# Patient Record
Sex: FEMALE | Race: OTHER | Employment: UNEMPLOYED | URBAN - METROPOLITAN AREA
[De-identification: names, ages, dates, MRNs, and addresses within clinical notes are randomized per-mention and may not be internally consistent; named-entity substitution may affect disease eponyms.]

---

## 2018-11-30 ENCOUNTER — LAB ENCOUNTER (OUTPATIENT)
Dept: LAB | Facility: HOSPITAL | Age: 58
End: 2018-11-30
Attending: ORTHOPAEDIC SURGERY
Payer: COMMERCIAL

## 2018-11-30 DIAGNOSIS — Z92.89 HISTORY OF MRI: Primary | ICD-10-CM

## 2018-11-30 PROCEDURE — 84520 ASSAY OF UREA NITROGEN: CPT

## 2018-11-30 PROCEDURE — 36415 COLL VENOUS BLD VENIPUNCTURE: CPT

## 2018-11-30 PROCEDURE — 82565 ASSAY OF CREATININE: CPT

## 2020-05-26 ENCOUNTER — LAB REQUISITION (OUTPATIENT)
Dept: LAB | Facility: HOSPITAL | Age: 60
End: 2020-05-26
Payer: COMMERCIAL

## 2020-05-26 DIAGNOSIS — Z20.828 CONTACT WITH AND (SUSPECTED) EXPOSURE TO OTHER VIRAL COMMUNICABLE DISEASES: ICD-10-CM

## 2021-12-21 ENCOUNTER — TELEPHONE (OUTPATIENT)
Dept: PHYSICAL MEDICINE AND REHAB | Facility: CLINIC | Age: 61
End: 2021-12-21

## 2021-12-21 ENCOUNTER — MED REC SCAN ONLY (OUTPATIENT)
Dept: PHYSICAL MEDICINE AND REHAB | Facility: CLINIC | Age: 61
End: 2021-12-21

## 2021-12-21 ENCOUNTER — OFFICE VISIT (OUTPATIENT)
Dept: PHYSICAL MEDICINE AND REHAB | Facility: CLINIC | Age: 61
End: 2021-12-21

## 2021-12-21 VITALS
DIASTOLIC BLOOD PRESSURE: 70 MMHG | OXYGEN SATURATION: 98 % | BODY MASS INDEX: 30.73 KG/M2 | HEIGHT: 64 IN | SYSTOLIC BLOOD PRESSURE: 132 MMHG | HEART RATE: 55 BPM | WEIGHT: 180 LBS

## 2021-12-21 DIAGNOSIS — M17.12 PRIMARY OSTEOARTHRITIS OF LEFT KNEE: Primary | ICD-10-CM

## 2021-12-21 DIAGNOSIS — M71.22 SYNOVIAL CYST OF LEFT POPLITEAL SPACE: ICD-10-CM

## 2021-12-21 DIAGNOSIS — I25.2 HISTORY OF MI (MYOCARDIAL INFARCTION): ICD-10-CM

## 2021-12-21 DIAGNOSIS — M22.2X9 PATELLOFEMORAL PAIN SYNDROME, UNSPECIFIED LATERALITY: ICD-10-CM

## 2021-12-21 DIAGNOSIS — E66.09 CLASS 1 OBESITY DUE TO EXCESS CALORIES WITH SERIOUS COMORBIDITY AND BODY MASS INDEX (BMI) OF 30.0 TO 30.9 IN ADULT: ICD-10-CM

## 2021-12-21 PROCEDURE — 20611 DRAIN/INJ JOINT/BURSA W/US: CPT | Performed by: PHYSICAL MEDICINE & REHABILITATION

## 2021-12-21 PROCEDURE — 3008F BODY MASS INDEX DOCD: CPT | Performed by: PHYSICAL MEDICINE & REHABILITATION

## 2021-12-21 PROCEDURE — 3078F DIAST BP <80 MM HG: CPT | Performed by: PHYSICAL MEDICINE & REHABILITATION

## 2021-12-21 PROCEDURE — 3075F SYST BP GE 130 - 139MM HG: CPT | Performed by: PHYSICAL MEDICINE & REHABILITATION

## 2021-12-21 PROCEDURE — 99204 OFFICE O/P NEW MOD 45 MIN: CPT | Performed by: PHYSICAL MEDICINE & REHABILITATION

## 2021-12-21 RX ORDER — ASPIRIN 81 MG/1
81 TABLET ORAL
COMMUNITY

## 2021-12-21 RX ORDER — LIDOCAINE HYDROCHLORIDE 10 MG/ML
3.5 INJECTION, SOLUTION INFILTRATION; PERINEURAL ONCE
Status: COMPLETED | OUTPATIENT
Start: 2021-12-21 | End: 2021-12-21

## 2021-12-21 RX ORDER — NAPROXEN 500 MG/1
500 TABLET ORAL 2 TIMES DAILY WITH MEALS
Qty: 60 TABLET | Refills: 0 | Status: SHIPPED | OUTPATIENT
Start: 2021-12-21 | End: 2022-01-18

## 2021-12-21 RX ORDER — TRIAMCINOLONE ACETONIDE 40 MG/ML
20 INJECTION, SUSPENSION INTRA-ARTICULAR; INTRAMUSCULAR ONCE
Status: COMPLETED | OUTPATIENT
Start: 2021-12-21 | End: 2021-12-21

## 2021-12-21 RX ORDER — AMIODARONE HYDROCHLORIDE 200 MG/1
200 TABLET ORAL DAILY
COMMUNITY
Start: 2021-11-21

## 2021-12-21 NOTE — TELEPHONE ENCOUNTER
Initiated authorization for left baker cyst aspiration and CSI CPT 44785+ with Yuriy montes [place] pre-Auth website    Status: Approved  [id number] - DRAIN/INJ JOINT/BURSA W/O US  No authorization required.    - TRIAMCINOLONE ACET INJ NOS  No authoriz

## 2021-12-21 NOTE — TELEPHONE ENCOUNTER
Per Kei Power will not be in effect until 1/1/2022.   (Charged for Visit, Injection and 20% add'l discount per [facility])

## 2021-12-21 NOTE — PROCEDURES
[address]   [facility] Cyst Evaluation and Aspiration Procedure Note    CHIEF COMPLAINT:  Patient presents with:  Knee Pain: New right handed patient comes in for L knee pain, denies N/T.   Pain started in 10/202 with minimal blood loss. All questions were answered. No barriers to learning were identified. Permanent pictures were saved in our PACS systeme. INSTRUCTIONS GIVEN TO PATIENT:    \"You will see an effect in the next 2-3 days.   Please contact m

## 2021-12-21 NOTE — PATIENT INSTRUCTIONS
1) We performed a left baker cyst aspiration and CSI today  2) Get XR of the left knee when you return from [facility]  3) Take Naprosyn 500 mg 1 tablet twice per day with food for the next two weeks and then as needed but no more than 2 tablets per day.  Do no

## 2021-12-21 NOTE — H&P
[facility]  [address] [facility] H&P    Requesting Physician: Jillian Rae    Chief Complaint (Reason for Visit):  Patient presents with:  Knee Pain: New right handed patient comes in for L knee pain, denies N/T.   Pain sta EC Take 81 mg by mouth. • Calcium Carbonate 1500 (600 Ca) MG Oral Tab Take 600 mg by mouth As Directed. • amiodarone 200 MG Oral Tab Take 200 mg by mouth daily.      • naproxen (NAPROSYN) 500 MG Oral Tab Take 1 tablet (500 mg total) by mouth 2 (two) for instablity  Wendy Test: Positive for pain    Gait:  Antalgic    Data  No visits with results within 6 Month(s) from this visit.    Latest known visit with results is:   Lab Requisition on 05/26/2020   Component Date Value Ref Range Status   • Rapid S

## 2022-01-06 ENCOUNTER — HOSPITAL ENCOUNTER (OUTPATIENT)
Dept: ULTRASOUND IMAGING | Facility: HOSPITAL | Age: 62
Discharge: HOME | End: 2022-01-06
Attending: PHYSICAL MEDICINE & REHABILITATION
Payer: COMMERCIAL

## 2022-01-06 ENCOUNTER — OFFICE VISIT (OUTPATIENT)
Dept: PHYSICAL MEDICINE AND REHAB | Facility: CLINIC | Age: 62
End: 2022-01-06
Payer: COMMERCIAL

## 2022-01-06 ENCOUNTER — HOSPITAL ENCOUNTER (OUTPATIENT)
Dept: GENERAL RADIOLOGY | Facility: HOSPITAL | Age: 62
Discharge: HOME | End: 2022-01-06
Attending: PHYSICAL MEDICINE & REHABILITATION
Payer: COMMERCIAL

## 2022-01-06 VITALS
HEART RATE: 60 BPM | WEIGHT: 180 LBS | BODY MASS INDEX: 30.73 KG/M2 | OXYGEN SATURATION: 97 % | SYSTOLIC BLOOD PRESSURE: 140 MMHG | HEIGHT: 64 IN | DIASTOLIC BLOOD PRESSURE: 80 MMHG

## 2022-01-06 DIAGNOSIS — M17.12 PRIMARY OSTEOARTHRITIS OF LEFT KNEE: ICD-10-CM

## 2022-01-06 DIAGNOSIS — E66.09 CLASS 1 OBESITY DUE TO EXCESS CALORIES WITH SERIOUS COMORBIDITY AND BODY MASS INDEX (BMI) OF 30.0 TO 30.9 IN ADULT: ICD-10-CM

## 2022-01-06 DIAGNOSIS — M22.2X9 PATELLOFEMORAL PAIN SYNDROME, UNSPECIFIED LATERALITY: ICD-10-CM

## 2022-01-06 DIAGNOSIS — M67.979 TIBIALIS POSTERIOR TENDINOPATHY: ICD-10-CM

## 2022-01-06 DIAGNOSIS — M79.89 LEFT LEG SWELLING: ICD-10-CM

## 2022-01-06 DIAGNOSIS — M22.2X9 PATELLOFEMORAL PAIN SYNDROME, UNSPECIFIED LATERALITY: Primary | ICD-10-CM

## 2022-01-06 DIAGNOSIS — K29.60 NSAID INDUCED GASTRITIS: ICD-10-CM

## 2022-01-06 DIAGNOSIS — I25.2 HISTORY OF MI (MYOCARDIAL INFARCTION): ICD-10-CM

## 2022-01-06 DIAGNOSIS — T39.395A NSAID INDUCED GASTRITIS: ICD-10-CM

## 2022-01-06 DIAGNOSIS — M71.22 SYNOVIAL CYST OF LEFT POPLITEAL SPACE: ICD-10-CM

## 2022-01-06 PROCEDURE — 99214 OFFICE O/P EST MOD 30 MIN: CPT | Performed by: PHYSICAL MEDICINE & REHABILITATION

## 2022-01-06 PROCEDURE — 3079F DIAST BP 80-89 MM HG: CPT | Performed by: PHYSICAL MEDICINE & REHABILITATION

## 2022-01-06 PROCEDURE — 93971 EXTREMITY STUDY: CPT | Performed by: PHYSICAL MEDICINE & REHABILITATION

## 2022-01-06 PROCEDURE — 3077F SYST BP >= 140 MM HG: CPT | Performed by: PHYSICAL MEDICINE & REHABILITATION

## 2022-01-06 PROCEDURE — 3008F BODY MASS INDEX DOCD: CPT | Performed by: PHYSICAL MEDICINE & REHABILITATION

## 2022-01-06 PROCEDURE — 73564 X-RAY EXAM KNEE 4 OR MORE: CPT | Performed by: PHYSICAL MEDICINE & REHABILITATION

## 2022-01-06 RX ORDER — OMEPRAZOLE 20 MG/1
20 CAPSULE, DELAYED RELEASE ORAL
Qty: 40 CAPSULE | Refills: 0 | Status: SHIPPED | OUTPATIENT
Start: 2022-01-06

## 2022-01-06 NOTE — PATIENT INSTRUCTIONS
1) Continue Naprosyn twice per day and take with omeprazole 20 mg twice per day with food.    2) Get a doppler ultrasound STAT  3) If no blood clot, then would recommend physical therapy as I feel this is posterior tibialis tendinopathy  4) Follow up with monica

## 2022-01-06 NOTE — PROGRESS NOTES
[address]  Progress Note    CHIEF COMPLAINT:  Patient presents with: Ankle Pain: LOV 12/21/21. C/o left ankle pain and swelling started last week w/o cause. Rates 8/10. N/T to toes.  Pain aggravated by walk CURRENT MEDICATIONS:   Current Outpatient Medications   Medication Sig Dispense Refill   • omeprazole 20 MG Oral Capsule Delayed Release Take 1 capsule (20 mg total) by mouth 2 (two) times daily before meals.  40 capsule 0   • aspirin 81 MG Oral Tab E tunnel as well as left lower extremity  Palpation: Tenderness to palpation over the left tarsal tunnel and posterior tibialis muscle belly proximally  ROM: Full active ROM in all planes of the ankle.        Gait:  Antalgic    Data  No visits with results wi NSAID induced gastritis. I have given her a prescription for the omeprazole now. She will focus of physical therapy on both the left ankle as well as left knee. She will follow-up with me in about 6 to 8 weeks.        RTC in 6 to 8 weeks  Discharge Instr

## 2022-01-17 DIAGNOSIS — E66.09 CLASS 1 OBESITY DUE TO EXCESS CALORIES WITH SERIOUS COMORBIDITY AND BODY MASS INDEX (BMI) OF 30.0 TO 30.9 IN ADULT: ICD-10-CM

## 2022-01-17 DIAGNOSIS — M17.12 PRIMARY OSTEOARTHRITIS OF LEFT KNEE: ICD-10-CM

## 2022-01-17 DIAGNOSIS — I25.2 HISTORY OF MI (MYOCARDIAL INFARCTION): ICD-10-CM

## 2022-01-17 DIAGNOSIS — M22.2X9 PATELLOFEMORAL PAIN SYNDROME, UNSPECIFIED LATERALITY: ICD-10-CM

## 2022-01-17 DIAGNOSIS — M71.22 SYNOVIAL CYST OF LEFT POPLITEAL SPACE: ICD-10-CM

## 2022-01-18 RX ORDER — NAPROXEN 500 MG/1
TABLET ORAL
Qty: 60 TABLET | Refills: 0 | Status: SHIPPED | OUTPATIENT
Start: 2022-01-18

## 2022-01-18 NOTE — TELEPHONE ENCOUNTER
Medication request: naproxen (NAPROSYN) 500 MG Oral Tab  Sig:   Take 1 tablet (500 mg total) by mouth 2 (two) times daily with meals. Take for 2 weeks as directed and then as needed.   Qty#60R-0    LOV: 1/6/22  NOV: None    [place]Last [place]:12/21/21

## 2022-06-01 ENCOUNTER — TELEPHONE (OUTPATIENT)
Dept: PHYSICAL MEDICINE AND REHAB | Facility: CLINIC | Age: 62
End: 2022-06-01

## 2022-06-01 DIAGNOSIS — M17.12 OSTEOARTHRITIS OF LEFT KNEE, UNSPECIFIED OSTEOARTHRITIS TYPE: ICD-10-CM

## 2022-06-01 DIAGNOSIS — G89.29 CHRONIC PAIN OF LEFT KNEE: Primary | ICD-10-CM

## 2022-06-01 DIAGNOSIS — M25.562 CHRONIC PAIN OF LEFT KNEE: Primary | ICD-10-CM

## 2022-06-01 NOTE — TELEPHONE ENCOUNTER
Order placed    [employer]  Aries George MD, [address] [facility]  Physical Medicine and Rehabilitation/Sports Medicine  [facility]

## 2022-06-02 ENCOUNTER — TELEPHONE (OUTPATIENT)
Dept: PHYSICAL MEDICINE AND REHAB | Facility: CLINIC | Age: 62
End: 2022-06-02

## 2022-06-02 NOTE — TELEPHONE ENCOUNTER
Completed [facility] Authorization Form to initiate authorization for LEFT knee CSI and baker cyst aspiration under ultrasound guidance CPT 48704+  Clinicals and PA form faxed to [phone]  Status:pending

## 2022-06-06 ENCOUNTER — OFFICE VISIT (OUTPATIENT)
Dept: PHYSICAL MEDICINE AND REHAB | Facility: CLINIC | Age: 62
End: 2022-06-06
Payer: COMMERCIAL

## 2022-06-06 VITALS
HEIGHT: 64 IN | DIASTOLIC BLOOD PRESSURE: 70 MMHG | OXYGEN SATURATION: 98 % | BODY MASS INDEX: 33.46 KG/M2 | SYSTOLIC BLOOD PRESSURE: 132 MMHG | WEIGHT: 196 LBS

## 2022-06-06 DIAGNOSIS — M25.562 CHRONIC PAIN OF LEFT KNEE: Primary | ICD-10-CM

## 2022-06-06 DIAGNOSIS — M67.979 TIBIALIS POSTERIOR TENDINOPATHY: ICD-10-CM

## 2022-06-06 DIAGNOSIS — I25.2 HISTORY OF MI (MYOCARDIAL INFARCTION): ICD-10-CM

## 2022-06-06 DIAGNOSIS — M71.22 SYNOVIAL CYST OF LEFT POPLITEAL SPACE: ICD-10-CM

## 2022-06-06 DIAGNOSIS — E66.09 CLASS 1 OBESITY DUE TO EXCESS CALORIES WITH SERIOUS COMORBIDITY AND BODY MASS INDEX (BMI) OF 30.0 TO 30.9 IN ADULT: ICD-10-CM

## 2022-06-06 DIAGNOSIS — M17.12 OSTEOARTHRITIS OF LEFT KNEE, UNSPECIFIED OSTEOARTHRITIS TYPE: ICD-10-CM

## 2022-06-06 DIAGNOSIS — M17.12 PRIMARY OSTEOARTHRITIS OF LEFT KNEE: ICD-10-CM

## 2022-06-06 DIAGNOSIS — K29.60 NSAID INDUCED GASTRITIS: ICD-10-CM

## 2022-06-06 DIAGNOSIS — M79.89 LEFT LEG SWELLING: ICD-10-CM

## 2022-06-06 DIAGNOSIS — G89.29 CHRONIC PAIN OF LEFT KNEE: Primary | ICD-10-CM

## 2022-06-06 DIAGNOSIS — T39.395A NSAID INDUCED GASTRITIS: ICD-10-CM

## 2022-06-06 DIAGNOSIS — M22.2X9 PATELLOFEMORAL PAIN SYNDROME, UNSPECIFIED LATERALITY: ICD-10-CM

## 2022-06-06 NOTE — PATIENT INSTRUCTIONS
Post Injection Instructions     1. Please do not do anything strenuous over the next two days (if you had a knee injection do not walk more than 2 city blocks, do not attend any aerobic classes, do not run, no heavy lifting, no prolong standing). 2. You may resume your day to day activities after your injection. 3. You may experience some mild amount of swelling after the procedure. 4. Please ice your joint that was injected at least 5-6 times a day (15 minutes) for two days after (this will help prevent worsening pain that sometimes occurs after an injection). 5. Only take tylenol if needed for pain for the first few days. 6. Watch for signs of infection which include redness, warmth, worsening pain, fevers or chills. If you develop any of these signs call the office immediately at [phone]    Everyone responds differently to injections, but you can expect your peak effects a few weeks after your last injection. Yonatan Winkler. Jordan Lozano MD  Physical Medicine and Rehabilitation/Sports Medicine  [facility] [place]    1) Continue home exercise program and weight loss program  2) Tylenol 500-1000 mg every 6-8 hours as needed for pain. No more than 3000 mg daily. 3) Follow up in 6-8 weeks.

## 2022-06-06 NOTE — TELEPHONE ENCOUNTER
Received Approval from [facility] for LEFT knee CSI and baker cyst aspiration under ultrasound guidance with authorization #[id number] valid 6/2/22-9/1/22    LETY Irizarry and Dr. Angel Poole notified.   Patient is receiving injection in office today

## 2022-07-25 ENCOUNTER — OFFICE VISIT (OUTPATIENT)
Dept: PHYSICAL MEDICINE AND REHAB | Facility: CLINIC | Age: 62
End: 2022-07-25
Payer: COMMERCIAL

## 2022-07-25 VITALS — HEIGHT: 64 IN | WEIGHT: 196 LBS | BODY MASS INDEX: 33.46 KG/M2

## 2022-07-25 DIAGNOSIS — M17.12 OSTEOARTHRITIS OF LEFT KNEE, UNSPECIFIED OSTEOARTHRITIS TYPE: ICD-10-CM

## 2022-07-25 DIAGNOSIS — M17.12 PRIMARY OSTEOARTHRITIS OF LEFT KNEE: ICD-10-CM

## 2022-07-25 DIAGNOSIS — K29.60 NSAID INDUCED GASTRITIS: ICD-10-CM

## 2022-07-25 DIAGNOSIS — I25.2 HISTORY OF MI (MYOCARDIAL INFARCTION): ICD-10-CM

## 2022-07-25 DIAGNOSIS — M25.562 CHRONIC PAIN OF LEFT KNEE: Primary | ICD-10-CM

## 2022-07-25 DIAGNOSIS — T39.395A NSAID INDUCED GASTRITIS: ICD-10-CM

## 2022-07-25 DIAGNOSIS — M22.2X9 PATELLOFEMORAL PAIN SYNDROME, UNSPECIFIED LATERALITY: ICD-10-CM

## 2022-07-25 DIAGNOSIS — E66.09 CLASS 1 OBESITY DUE TO EXCESS CALORIES WITH SERIOUS COMORBIDITY AND BODY MASS INDEX (BMI) OF 30.0 TO 30.9 IN ADULT: ICD-10-CM

## 2022-07-25 DIAGNOSIS — G89.29 CHRONIC PAIN OF LEFT KNEE: Primary | ICD-10-CM

## 2022-07-25 DIAGNOSIS — M67.979 TIBIALIS POSTERIOR TENDINOPATHY: ICD-10-CM

## 2022-07-25 DIAGNOSIS — M71.22 SYNOVIAL CYST OF LEFT POPLITEAL SPACE: ICD-10-CM

## 2022-07-25 NOTE — PATIENT INSTRUCTIONS
1) Tylenol 500-1000 mg every 6-8 hours as needed for pain. No more than 3000 mg daily. 2) Schedule MRI LEFT knee to evaluate for meniscal pathology vs tendonitis  3) Continue home exercise program with an emphasis on strengthening and stretching the muscles around the knee and glutes  4) Supplement with daily walking.    5) Lets touch base in about 6-8 weeks but I would like to see you sooner to review the MRI images together

## 2022-09-02 ENCOUNTER — HOSPITAL ENCOUNTER (OUTPATIENT)
Age: 62
Discharge: HOME | End: 2022-09-02
Payer: COMMERCIAL

## 2022-09-02 VITALS
SYSTOLIC BLOOD PRESSURE: 139 MMHG | HEART RATE: 60 BPM | OXYGEN SATURATION: 98 % | DIASTOLIC BLOOD PRESSURE: 51 MMHG | WEIGHT: 180 LBS | BODY MASS INDEX: 30.73 KG/M2 | HEIGHT: 64 IN | TEMPERATURE: 97 F | RESPIRATION RATE: 18 BRPM

## 2022-09-02 DIAGNOSIS — K21.00 GASTROESOPHAGEAL REFLUX DISEASE WITH ESOPHAGITIS WITHOUT HEMORRHAGE: Primary | ICD-10-CM

## 2022-09-02 LAB
#MXD IC: 0.3 X10ˆ3/UL (ref 0.1–1)
BUN BLD-MCNC: 18 MG/DL (ref 7–18)
CHLORIDE BLD-SCNC: 103 MMOL/L (ref 98–112)
CO2 BLD-SCNC: 26 MMOL/L (ref 21–32)
CREAT BLD-MCNC: 0.8 MG/DL
GFR SERPLBLD BASED ON 1.73 SQ M-ARVRAT: 83 ML/MIN/1.73M2 (ref 60–?)
GLUCOSE BLD-MCNC: 124 MG/DL (ref 70–99)
HCT VFR BLD AUTO: 41 %
HCT VFR BLD CALC: 45 %
HGB BLD-MCNC: 13.1 G/DL
ISTAT IONIZED CALCIUM FOR CHEM 8: 1.18 MMOL/L (ref 1.12–1.32)
LYMPHOCYTES # BLD AUTO: 0.9 X10ˆ3/UL (ref 1–4)
LYMPHOCYTES NFR BLD AUTO: 21.5 %
MCH RBC QN AUTO: 28.4 PG (ref 26–34)
MCHC RBC AUTO-ENTMCNC: 32 G/DL (ref 31–37)
MCV RBC AUTO: 88.7 FL (ref 80–100)
MIXED CELL %: 6.6 %
NEUTROPHILS # BLD AUTO: 3.2 X10ˆ3/UL (ref 1.5–7.7)
NEUTROPHILS NFR BLD AUTO: 71.9 %
PLATELET # BLD AUTO: 142 X10ˆ3/UL (ref 150–450)
POTASSIUM BLD-SCNC: 4 MMOL/L (ref 3.6–5.1)
RBC # BLD AUTO: 4.62 X10ˆ6/UL
SODIUM BLD-SCNC: 141 MMOL/L (ref 136–145)
WBC # BLD AUTO: 4.4 X10ˆ3/UL (ref 4–11)

## 2022-09-02 RX ORDER — LIDOCAINE HYDROCHLORIDE 20 MG/ML
5 SOLUTION OROPHARYNGEAL ONCE
Status: COMPLETED | OUTPATIENT
Start: 2022-09-02 | End: 2022-09-02

## 2022-09-02 RX ORDER — MAGNESIUM HYDROXIDE/ALUMINUM HYDROXICE/SIMETHICONE 120; 1200; 1200 MG/30ML; MG/30ML; MG/30ML
30 SUSPENSION ORAL ONCE
Status: COMPLETED | OUTPATIENT
Start: 2022-09-02 | End: 2022-09-02

## 2022-09-02 RX ORDER — DICYCLOMINE HYDROCHLORIDE 10 MG/5ML
10 SOLUTION ORAL ONCE
Status: COMPLETED | OUTPATIENT
Start: 2022-09-02 | End: 2022-09-02

## 2022-09-02 NOTE — ED INITIAL ASSESSMENT (HPI)
Patient presents a&o 4/4 with c/o abd pain that is between RUQ and LUQ. States \"feels like I ate a lot\" but denies eating much food.+ dizziness + weakness  X 4 days. States 6 abnormal BMs, some diarrhea. Denies fever.  Denies blood in stool

## 2022-12-28 ENCOUNTER — HOSPITAL ENCOUNTER (OUTPATIENT)
Age: 62
Discharge: HOME | End: 2022-12-28
Payer: COMMERCIAL

## 2022-12-28 VITALS
RESPIRATION RATE: 18 BRPM | SYSTOLIC BLOOD PRESSURE: 156 MMHG | OXYGEN SATURATION: 99 % | HEART RATE: 57 BPM | BODY MASS INDEX: 30.73 KG/M2 | DIASTOLIC BLOOD PRESSURE: 49 MMHG | HEIGHT: 64 IN | WEIGHT: 180 LBS | TEMPERATURE: 97 F

## 2022-12-28 DIAGNOSIS — R05.1 ACUTE COUGH: ICD-10-CM

## 2022-12-28 DIAGNOSIS — U07.1 COVID-19: Primary | ICD-10-CM

## 2022-12-28 LAB — SARS-COV-2 RNA RESP QL NAA+PROBE: DETECTED

## 2022-12-28 PROCEDURE — U0002 COVID-19 LAB TEST NON-CDC: HCPCS

## 2022-12-28 PROCEDURE — 99213 OFFICE O/P EST LOW 20 MIN: CPT

## 2022-12-28 RX ORDER — FLUTICASONE PROPIONATE 50 MCG
2 SPRAY, SUSPENSION (ML) NASAL DAILY
Qty: 16 G | Refills: 0 | Status: SHIPPED | OUTPATIENT
Start: 2022-12-28 | End: 2023-01-27

## 2022-12-28 NOTE — DISCHARGE INSTRUCTIONS
COVID test was positive. You should quarantine for 5 days. Regardless of your symptoms, you should wear a mask in public for 5 days. You should buy a pulse ox and monitor oxygen level, advised to go to the emergency department if SPO2 is less than 92%. Go to the emergency department if you develop any chest pain, shortness of breath, fever, vomiting, diarrhea or any other concerning complaints. Use Flonase for nasal congestion, mucinex for chest congestion, tylenol or ibuprofen for fever or pain. Increase PO fluid intake. Follow up with PCP in 2-3 days. Can use Coricidin HBP as a decongestant if needed. Do not use any other decongestants.

## 2023-09-16 ENCOUNTER — APPOINTMENT (OUTPATIENT)
Dept: GENERAL RADIOLOGY | Facility: HOSPITAL | Age: 63
End: 2023-09-16
Attending: EMERGENCY MEDICINE
Payer: COMMERCIAL

## 2023-09-16 ENCOUNTER — HOSPITAL ENCOUNTER (EMERGENCY)
Facility: HOSPITAL | Age: 63
Discharge: HOME | End: 2023-09-16
Attending: EMERGENCY MEDICINE
Payer: COMMERCIAL

## 2023-09-16 VITALS
OXYGEN SATURATION: 97 % | DIASTOLIC BLOOD PRESSURE: 84 MMHG | SYSTOLIC BLOOD PRESSURE: 132 MMHG | HEART RATE: 74 BPM | HEIGHT: 64 IN | RESPIRATION RATE: 17 BRPM | BODY MASS INDEX: 31.58 KG/M2 | TEMPERATURE: 97 F | WEIGHT: 185 LBS

## 2023-09-16 DIAGNOSIS — S52.501A CLOSED FRACTURE OF DISTAL ENDS OF RIGHT RADIUS AND ULNA, INITIAL ENCOUNTER: Primary | ICD-10-CM

## 2023-09-16 DIAGNOSIS — S52.601A CLOSED FRACTURE OF DISTAL ENDS OF RIGHT RADIUS AND ULNA, INITIAL ENCOUNTER: Primary | ICD-10-CM

## 2023-09-16 PROCEDURE — 73110 X-RAY EXAM OF WRIST: CPT | Performed by: EMERGENCY MEDICINE

## 2023-09-16 PROCEDURE — 29125 APPL SHORT ARM SPLINT STATIC: CPT

## 2023-09-16 PROCEDURE — 99284 EMERGENCY DEPT VISIT MOD MDM: CPT

## 2023-09-16 PROCEDURE — 73090 X-RAY EXAM OF FOREARM: CPT | Performed by: EMERGENCY MEDICINE

## 2023-09-16 RX ORDER — IBUPROFEN 400 MG/1
400 TABLET ORAL ONCE
Status: COMPLETED | OUTPATIENT
Start: 2023-09-16 | End: 2023-09-16

## 2023-09-16 RX ORDER — ACETAMINOPHEN 500 MG
1000 TABLET ORAL EVERY 6 HOURS PRN
Qty: 56 TABLET | Refills: 0 | Status: SHIPPED | OUTPATIENT
Start: 2023-09-16 | End: 2023-09-30

## 2023-09-16 RX ORDER — IBUPROFEN 200 MG
200 TABLET ORAL EVERY 6 HOURS PRN
Qty: 56 TABLET | Refills: 0 | Status: SHIPPED | OUTPATIENT
Start: 2023-09-16 | End: 2023-09-30

## 2023-09-16 RX ORDER — ACETAMINOPHEN 500 MG
1000 TABLET ORAL ONCE
Status: COMPLETED | OUTPATIENT
Start: 2023-09-16 | End: 2023-09-16

## 2023-09-16 NOTE — ED INITIAL ASSESSMENT (HPI)
Pt to the ed following mechanical fall and fracturing right wrist 1 week ago while in [facility]. Was unable to be treated while there because no ortho was available.    Denies head injury or LOC at time of fall

## 2023-09-16 NOTE — DISCHARGE INSTRUCTIONS
For your pain you may take the following:  -Acetaminophen (tylenol) 1000mg (two extra strength tablets) every 6 hours  -Ibuprofen (motrin) 400mg (two regular strength tablets) every 6 hours  -You should alternate between the two every 3 hours for the next 2 days, then switch to as needed.   -Take the medications with food to avoid an upset stomach.

## 2023-09-22 ENCOUNTER — LAB ENCOUNTER (OUTPATIENT)
Dept: LAB | Age: 63
End: 2023-09-22
Attending: FAMILY MEDICINE
Payer: MEDICARE

## 2023-09-22 DIAGNOSIS — Z01.818 PREOPERATIVE EXAMINATION, UNSPECIFIED: Primary | ICD-10-CM

## 2023-09-22 LAB
ALBUMIN SERPL-MCNC: 4 G/DL (ref 3.4–5)
ALBUMIN/GLOB SERPL: 1.3 {RATIO} (ref 1–2)
ALP LIVER SERPL-CCNC: 87 U/L
ALT SERPL-CCNC: 30 U/L
ANION GAP SERPL CALC-SCNC: 7 MMOL/L (ref 0–18)
AST SERPL-CCNC: 17 U/L (ref 15–37)
ATRIAL RATE: 54 BPM
BILIRUB SERPL-MCNC: 0.7 MG/DL (ref 0.1–2)
BUN BLD-MCNC: 19 MG/DL (ref 7–18)
BUN/CREAT SERPL: 17.8 (ref 10–20)
CALCIUM BLD-MCNC: 9.2 MG/DL (ref 8.5–10.1)
CHLORIDE SERPL-SCNC: 108 MMOL/L (ref 98–112)
CO2 SERPL-SCNC: 27 MMOL/L (ref 21–32)
CREAT BLD-MCNC: 1.07 MG/DL
EGFRCR SERPLBLD CKD-EPI 2021: 58 ML/MIN/1.73M2 (ref 60–?)
FASTING STATUS PATIENT QL REPORTED: NO
GLOBULIN PLAS-MCNC: 3.2 G/DL (ref 2.8–4.4)
GLUCOSE BLD-MCNC: 110 MG/DL (ref 70–99)
OSMOLALITY SERPL CALC.SUM OF ELEC: 297 MOSM/KG (ref 275–295)
P AXIS: 38 DEGREES
P-R INTERVAL: 186 MS
POTASSIUM SERPL-SCNC: 4.1 MMOL/L (ref 3.5–5.1)
PROT SERPL-MCNC: 7.2 G/DL (ref 6.4–8.2)
Q-T INTERVAL: 440 MS
QRS DURATION: 84 MS
QTC CALCULATION (BEZET): 417 MS
R AXIS: 2 DEGREES
SODIUM SERPL-SCNC: 142 MMOL/L (ref 136–145)
T AXIS: -34 DEGREES
VENTRICULAR RATE: 54 BPM

## 2023-09-22 PROCEDURE — 93005 ELECTROCARDIOGRAM TRACING: CPT

## 2023-09-22 PROCEDURE — 80053 COMPREHEN METABOLIC PANEL: CPT

## 2023-09-22 PROCEDURE — 36415 COLL VENOUS BLD VENIPUNCTURE: CPT

## 2023-09-22 PROCEDURE — 93010 ELECTROCARDIOGRAM REPORT: CPT | Performed by: STUDENT IN AN ORGANIZED HEALTH CARE EDUCATION/TRAINING PROGRAM

## 2023-11-12 ENCOUNTER — HOSPITAL ENCOUNTER (EMERGENCY)
Facility: HOSPITAL | Age: 63
Discharge: HOME | End: 2023-11-12
Attending: EMERGENCY MEDICINE
Payer: MEDICARE

## 2023-11-12 ENCOUNTER — APPOINTMENT (OUTPATIENT)
Dept: GENERAL RADIOLOGY | Facility: HOSPITAL | Age: 63
End: 2023-11-12
Payer: MEDICARE

## 2023-11-12 VITALS
TEMPERATURE: 99 F | WEIGHT: 190 LBS | HEART RATE: 66 BPM | BODY MASS INDEX: 31.65 KG/M2 | OXYGEN SATURATION: 97 % | SYSTOLIC BLOOD PRESSURE: 117 MMHG | DIASTOLIC BLOOD PRESSURE: 52 MMHG | RESPIRATION RATE: 12 BRPM | HEIGHT: 65 IN

## 2023-11-12 DIAGNOSIS — R55 SYNCOPE, NEAR: ICD-10-CM

## 2023-11-12 DIAGNOSIS — R07.9 CHEST PAIN OF UNCERTAIN ETIOLOGY: Primary | ICD-10-CM

## 2023-11-12 LAB
ALBUMIN SERPL-MCNC: 4.7 G/DL (ref 3.2–4.8)
ALBUMIN/GLOB SERPL: 1.7 {RATIO} (ref 1–2)
ALP LIVER SERPL-CCNC: 98 U/L
ALT SERPL-CCNC: 11 U/L
ANION GAP SERPL CALC-SCNC: 7 MMOL/L (ref 0–18)
AST SERPL-CCNC: 14 U/L (ref ?–34)
BASOPHILS # BLD AUTO: 0.03 X10(3) UL (ref 0–0.2)
BASOPHILS NFR BLD AUTO: 0.4 %
BILIRUB SERPL-MCNC: 0.7 MG/DL (ref 0.2–1.1)
BUN BLD-MCNC: 21 MG/DL (ref 9–23)
BUN/CREAT SERPL: 17.8 (ref 10–20)
CALCIUM BLD-MCNC: 9.6 MG/DL (ref 8.7–10.4)
CHLORIDE SERPL-SCNC: 104 MMOL/L (ref 98–112)
CO2 SERPL-SCNC: 28 MMOL/L (ref 21–32)
CREAT BLD-MCNC: 1.18 MG/DL
DEPRECATED RDW RBC AUTO: 46.1 FL (ref 35.1–46.3)
EGFRCR SERPLBLD CKD-EPI 2021: 52 ML/MIN/1.73M2 (ref 60–?)
EOSINOPHIL # BLD AUTO: 0.03 X10(3) UL (ref 0–0.7)
EOSINOPHIL NFR BLD AUTO: 0.4 %
ERYTHROCYTE [DISTWIDTH] IN BLOOD BY AUTOMATED COUNT: 13.4 % (ref 11–15)
GLOBULIN PLAS-MCNC: 2.8 G/DL (ref 2.8–4.4)
GLUCOSE BLD-MCNC: 140 MG/DL (ref 70–99)
HCT VFR BLD AUTO: 40.6 %
HGB BLD-MCNC: 12.9 G/DL
IMM GRANULOCYTES # BLD AUTO: 0.02 X10(3) UL (ref 0–1)
IMM GRANULOCYTES NFR BLD: 0.3 %
LYMPHOCYTES # BLD AUTO: 1.69 X10(3) UL (ref 1–4)
LYMPHOCYTES NFR BLD AUTO: 21.8 %
MCH RBC QN AUTO: 29.1 PG (ref 26–34)
MCHC RBC AUTO-ENTMCNC: 31.8 G/DL (ref 31–37)
MCV RBC AUTO: 91.6 FL
MONOCYTES # BLD AUTO: 0.35 X10(3) UL (ref 0.1–1)
MONOCYTES NFR BLD AUTO: 4.5 %
NEUTROPHILS # BLD AUTO: 5.63 X10 (3) UL (ref 1.5–7.7)
NEUTROPHILS # BLD AUTO: 5.63 X10(3) UL (ref 1.5–7.7)
NEUTROPHILS NFR BLD AUTO: 72.6 %
OSMOLALITY SERPL CALC.SUM OF ELEC: 293 MOSM/KG (ref 275–295)
PLATELET # BLD AUTO: 257 10(3)UL (ref 150–450)
POTASSIUM SERPL-SCNC: 3.6 MMOL/L (ref 3.5–5.1)
PROT SERPL-MCNC: 7.5 G/DL (ref 5.7–8.2)
RBC # BLD AUTO: 4.43 X10(6)UL
SODIUM SERPL-SCNC: 139 MMOL/L (ref 136–145)
TROPONIN I SERPL HS-MCNC: 6 NG/L
WBC # BLD AUTO: 7.8 X10(3) UL (ref 4–11)

## 2023-11-12 PROCEDURE — 85025 COMPLETE CBC W/AUTO DIFF WBC: CPT

## 2023-11-12 PROCEDURE — 80053 COMPREHEN METABOLIC PANEL: CPT | Performed by: EMERGENCY MEDICINE

## 2023-11-12 PROCEDURE — 36415 COLL VENOUS BLD VENIPUNCTURE: CPT

## 2023-11-12 PROCEDURE — 93005 ELECTROCARDIOGRAM TRACING: CPT

## 2023-11-12 PROCEDURE — 71045 X-RAY EXAM CHEST 1 VIEW: CPT | Performed by: EMERGENCY MEDICINE

## 2023-11-12 PROCEDURE — 80053 COMPREHEN METABOLIC PANEL: CPT

## 2023-11-12 PROCEDURE — 85025 COMPLETE CBC W/AUTO DIFF WBC: CPT | Performed by: EMERGENCY MEDICINE

## 2023-11-12 PROCEDURE — 84484 ASSAY OF TROPONIN QUANT: CPT | Performed by: EMERGENCY MEDICINE

## 2023-11-12 PROCEDURE — 99284 EMERGENCY DEPT VISIT MOD MDM: CPT

## 2023-11-12 PROCEDURE — 99285 EMERGENCY DEPT VISIT HI MDM: CPT

## 2023-11-12 PROCEDURE — 93010 ELECTROCARDIOGRAM REPORT: CPT

## 2023-11-12 PROCEDURE — 84484 ASSAY OF TROPONIN QUANT: CPT

## 2023-11-12 NOTE — ED INITIAL ASSESSMENT (HPI)
Pt to ED with daughter, daughter provided interpretation, reporting chest pain and feeling faint since 1300, hx of MI. HILTON at rest. Denies nausea, vomiting, diarrhea, and constipation.

## 2023-11-13 LAB
ATRIAL RATE: 73 BPM
P AXIS: 48 DEGREES
P-R INTERVAL: 178 MS
Q-T INTERVAL: 420 MS
QRS DURATION: 90 MS
QTC CALCULATION (BEZET): 462 MS
R AXIS: 12 DEGREES
T AXIS: 64 DEGREES
VENTRICULAR RATE: 73 BPM

## 2023-12-14 ENCOUNTER — HOSPITAL ENCOUNTER (OUTPATIENT)
Dept: MAMMOGRAPHY | Age: 63
Discharge: HOME | End: 2023-12-14
Attending: FAMILY MEDICINE
Payer: MEDICARE

## 2023-12-14 DIAGNOSIS — Z12.31 ENCOUNTER FOR SCREENING MAMMOGRAM FOR MALIGNANT NEOPLASM OF BREAST: ICD-10-CM

## 2023-12-14 PROCEDURE — 77063 BREAST TOMOSYNTHESIS BI: CPT | Performed by: FAMILY MEDICINE

## 2023-12-14 PROCEDURE — 77067 SCR MAMMO BI INCL CAD: CPT | Performed by: FAMILY MEDICINE

## 2024-07-01 ENCOUNTER — TELEPHONE (OUTPATIENT)
Dept: PHYSICAL MEDICINE AND REHAB | Facility: CLINIC | Age: 64
End: 2024-07-01

## 2024-07-01 ENCOUNTER — OFFICE VISIT (OUTPATIENT)
Dept: PHYSICAL MEDICINE AND REHAB | Facility: CLINIC | Age: 64
End: 2024-07-01
Payer: MEDICARE

## 2024-07-01 VITALS — BODY MASS INDEX: 31.65 KG/M2 | WEIGHT: 190 LBS | HEIGHT: 65 IN

## 2024-07-01 DIAGNOSIS — E66.09 CLASS 1 OBESITY DUE TO EXCESS CALORIES WITH SERIOUS COMORBIDITY AND BODY MASS INDEX (BMI) OF 30.0 TO 30.9 IN ADULT: ICD-10-CM

## 2024-07-01 DIAGNOSIS — M25.571 ACUTE RIGHT ANKLE PAIN: ICD-10-CM

## 2024-07-01 DIAGNOSIS — M71.22 SYNOVIAL CYST OF LEFT POPLITEAL SPACE: ICD-10-CM

## 2024-07-01 DIAGNOSIS — M17.12 PRIMARY OSTEOARTHRITIS OF LEFT KNEE: ICD-10-CM

## 2024-07-01 DIAGNOSIS — M22.2X9 PATELLOFEMORAL PAIN SYNDROME, UNSPECIFIED LATERALITY: ICD-10-CM

## 2024-07-01 DIAGNOSIS — M76.821 TIBIALIS POSTERIOR TENDONITIS, RIGHT: Primary | ICD-10-CM

## 2024-07-01 DIAGNOSIS — I25.2 HISTORY OF MI (MYOCARDIAL INFARCTION): ICD-10-CM

## 2024-07-01 RX ORDER — NAPROXEN 500 MG/1
500 TABLET ORAL 2 TIMES DAILY PRN
Qty: 60 TABLET | Refills: 0 | Status: SHIPPED | OUTPATIENT
Start: 2024-07-01

## 2024-07-01 NOTE — TELEPHONE ENCOUNTER
Initiated authorization for RIGHT posterior tibialis tendon CSI under ultrasound CPT 43921, , 09211 with [employer]  Status: Approved-authorization is not required per health plan based on medical necessity however is not a guarantee of payment and may be subject to review once claim is submitted

## 2024-07-01 NOTE — PROCEDURES
[facility]   Posterior Tibialis tendon sheeth Injection Procedure Note     CHIEF COMPLAINT:  Patient presents with:     PROCEDURE PERFORMED:  Right posterior tibialis tendon sheeth corticosteroid injection under ultrasound guidance     INDICATIONS:  Right posterior tibialis tendiopathy     PRIMARY PROCEDURALIST:  Alex Behar     INFORMED CONSENT & TIME OUT:   As documented in the Time Out and Pre-Procedure Check Lists.  Verbal consent was obtained     Vitals:VSAMB@  Labs (document last wbc, plts, hgb, and PT/INR):  No visits with results within 6 Month(s) from this visit.   Latest known visit with results is:   Admission on 11/12/2023, Discharged on 11/12/2023   Component Date Value Ref Range Status    Ventricular rate 11/12/2023 73  BPM Final    Atrial rate 11/12/2023 73  BPM Final    P-R Interval 11/12/2023 178  ms Final    QRS Duration 11/12/2023 90  ms Final    Q-T Interval 11/12/2023 420  ms Final    QTC Calculation (Bezet) 11/12/2023 462  ms Final    P Axis 11/12/2023 48  degrees Final    R Axis 11/12/2023 12  degrees Final    T Garrison 11/12/2023 64  degrees Final    Hold Lavender 11/12/2023 Auto Resulted   Final    Hold Lt Green 11/12/2023 Auto Resulted   Final    Hold Blue 11/12/2023 Auto Resulted   Final    Hold Gold 11/12/2023 Auto Resulted   Final    Glucose 11/12/2023 140 (H)  70 - 99 mg/dL Final    Sodium 11/12/2023 139  136 - 145 mmol/L Final    Potassium 11/12/2023 3.6  3.5 - 5.1 mmol/L Final    Chloride 11/12/2023 104  98 - 112 mmol/L Final    CO2 11/12/2023 28.0  21.0 - 32.0 mmol/L Final    Anion Gap 11/12/2023 7  0 - 18 mmol/L Final    BUN 11/12/2023 21  9 - 23 mg/dL Final    Creatinine 11/12/2023 1.18 (H)  0.55 - 1.02 mg/dL Final    BUN/CREA Ratio 11/12/2023 17.8  10.0 - 20.0 Final    Calcium, Total 11/12/2023 9.6  8.7 - 10.4 mg/dL Final    Calculated Osmolality 11/12/2023 293  275 - 295 mOsm/kg Final    eGFR-Cr 11/12/2023 52 (L)  >=60 mL/min/1.73m2  Final    ALT 11/12/2023 11  10 - 49 U/L Final    AST 11/12/2023 14  <=34 U/L Final    Alkaline Phosphatase 11/12/2023 98  50 - 130 U/L Final    Bilirubin, Total 11/12/2023 0.7  0.2 - 1.1 mg/dL Final    Total Protein 11/12/2023 7.5  5.7 - 8.2 g/dL Final    Albumin 11/12/2023 4.7  3.2 - 4.8 g/dL Final    Globulin  11/12/2023 2.8  2.8 - 4.4 g/dL Final    A/G Ratio 11/12/2023 1.7  1.0 - 2.0 Final    Troponin I (High Sensitivity) 11/12/2023 6  <=34 ng/L Final    WBC 11/12/2023 7.8  4.0 - 11.0 x10(3) uL Final    RBC 11/12/2023 4.43  3.80 - 5.30 x10(6)uL Final    HGB 11/12/2023 12.9  12.0 - 16.0 g/dL Final    HCT 11/12/2023 40.6  35.0 - 48.0 % Final    MCV 11/12/2023 91.6  80.0 - 100.0 fL Final    MCH 11/12/2023 29.1  26.0 - 34.0 pg Final    MCHC 11/12/2023 31.8  31.0 - 37.0 g/dL Final    RDW-SD 11/12/2023 46.1  35.1 - 46.3 fL Final    RDW 11/12/2023 13.4  11.0 - 15.0 % Final    PLT 11/12/2023 257.0  150.0 - 450.0 10(3)uL Final    Neutrophil Absolute Prelim 11/12/2023 5.63  1.50 - 7.70 x10 (3) uL Final    Neutrophil Absolute 11/12/2023 5.63  1.50 - 7.70 x10(3) uL Final    Lymphocyte Absolute 11/12/2023 1.69  1.00 - 4.00 x10(3) uL Final    Monocyte Absolute 11/12/2023 0.35  0.10 - 1.00 x10(3) uL Final    Eosinophil Absolute 11/12/2023 0.03  0.00 - 0.70 x10(3) uL Final    Basophil Absolute 11/12/2023 0.03  0.00 - 0.20 x10(3) uL Final    Immature Granulocyte Absolute 11/12/2023 0.02  0.00 - 1.00 x10(3) uL Final    Neutrophil % 11/12/2023 72.6  % Final    Lymphocyte % 11/12/2023 21.8  % Final    Monocyte % 11/12/2023 4.5  % Final    Eosinophil % 11/12/2023 0.4  % Final    Basophil % 11/12/2023 0.4  % Final    Immature Granulocyte % 11/12/2023 0.3  % Final       PROCEDURE:     The Right medial ankle was prepped and draped in standard sterile fashion using 3 sticks of Betadine.  The Right posterior tibialis tendon as well as posterior tibial artery, vein, and nerve were visualized using the linear ultrasound probe.  Then a  25-gauge 1-1/2 inch needle was inserted under the linear ultrasound probe while injecting 2 cc of 1% lidocaine.  Once a needle was seen within the tendon sheath, a mixture of 2 cc of 1% lidocaine and 1 cc of Kenalog containing 40 mg / 1 cc of corticosteroid was visualized being injected into the tendon sheath.  The needle was then removed, hemostasis was obtained, and a Band-Aid was applied.  The patient tolerated the procedure well.       Patient verbalized understanding of assessment and plan.  Patient is in agreement with the plan.  All questions were answered.  No barriers to learning identified. Permanent pictures were saved in our PACS systeme.         INSTRUCTIONS GIVEN TO PATIENT:    \"You will see an effect in the next 2-3 days.  Please contact me if you have fevers, worsening swelling, worsening pain, decreased range of motion, increased redness, chills, or anything that makes you concerned about how the joint we injected feels/looks.  If you do not reach me in a reasonable time, please report directly to the emergency room for further evaluation\"     2 month     Alex B. Behar MD, [facility]  Physical Medicine and Rehabilitation/Sports Medicine  [facility]

## 2024-07-01 NOTE — PATIENT INSTRUCTIONS
1) Please get X-rays of the RIGHT ankl today on your way out.   2) Take Naprosyn 500 mg 1 tablet twice per day with food for the next two weeks and then as needed but no more than 2 tablets per day. Do not take with any other NSAIDS (Ibuprofen, Advil, Aleve, etc). OK to take Tylenol 500 mg every 6 hours as needed for pain. If you develop any side effects including stomach aches, nausea, vomiting, or other gastrointestinal symptoms, stop the medication and call my office.   3) Tylenol 500-1000 mg every 6-8 hours as needed for pain.  No more than 3000 mg daily.  4) Please begin physical therapy as soon as possible.   5) My office will call you to schedule the RIGHT posterior tibialis tendon CSI under ultrasound once the procedure is approved by your insurance carrier.    6) Follow-up with me in 6 to 8 weeks after you  begin physical therapy. If symptoms do not improve, then I would recommend an MRI of the RIGHT ankle      Instrucciones posteriores a la inyección   No ayaan nada extenuante michele los próximos dos días (si recibió ashley inyección en la [facility], no camine más de 2 stiven de la ciudad, no asista a clases de aeróbicos, no corra, no levante objetos pesados, no permanezca de pie por mucho tiempo).  -Puede reanudar luis fernando actividades cotidianas después de la inyección.  -Es posible que experimente ashley leve hinchazón después del procedimiento.  -Coloque hielo en la articulación que se inyectó al menos 5 a 6 veces al día (15 minutos) michele dos días después (esto ayudará a prevenir el empeoramiento del dolor que a veces ocurre después de ashley inyección).  -Solo tome tylenol si es necesario para el dolor michele los primeros días.  -Esté atento a los signos de infección que incluyen enrojecimiento, calor, empeoramiento del dolor, fiebre o escalofríos. Si desarrolla alguno de estos signos, llame a la oficina de inmediato al [phone]  -Todos responden a chapa manera diferente a las inyecciones, mee puede esperar luis fernando  efectos máximos unas semanas después de chapa última inyección.  Alex B. Behar MD  Physical Medicine and Rehabilitation/Sports Medicine  [facility]

## 2024-07-01 NOTE — PROGRESS NOTES
[facility]  Progress Note    CHIEF COMPLAINT:    Chief Complaint   Patient presents with    Ankle Pain     LOV: 7/25/2022 pt comes in with medial R ankle pain. Denies T/N. Denies weakness. Rates pain 5/10. Takes tylenol and ibuprofen PRN. States she was taking Tramadol 37.5mg from [place].       History of Present Illness:  The patient is a 63 year old  female with a significant past medical history of MI and right knee replacement who presents for follow-up with complaints of right medial ankle pain which has been ongoing for the last week and a half without an inciting event.  She rates her pain about a 5-6 out of 10, sharp and aching, and nonradiating.  Her symptoms are aggravated when she takes a step on the right side as well as going up and down stairs.  She has tried Tylenol and ibuprofen as well as tramadol 37.5 mg which she has from [place].  She denies any injections.  No imaging has been performed.  She does have a history of a right tibia fracture in the proximal aspect several years ago where she had an open reduction internal fixation.  She also had a large skin wound in the right medial third of the tibia.  This is as a result of her surgery.  She denies any paresthesias or weakness in the right leg or foot.  She did recently switch to [place] shoes.    PAST MEDICAL HISTORY:  Past Medical History:    Heart attack (HCC)       SURGICAL HISTORY:  Past Surgical History:   Procedure Laterality Date    Orif radius & ulna fractures Right     Tibia fracture surgery Right        SOCIAL HISTORY:   Social History     Occupational History    Not on file   Tobacco Use    Smoking status: Never    Smokeless tobacco: Never   Vaping Use    Vaping status: Never Used   Substance and Sexual Activity    Alcohol use: Never    Drug use: Never    Sexual activity: Not on file       FAMILY HISTORY:   Family History   Problem Relation Age of Onset    No Known Problems Mother      Diabetes Sister     Diabetes Brother        CURRENT MEDICATIONS:   Current Outpatient Medications   Medication Sig Dispense Refill    naproxen 500 MG Oral Tab Take 1 tablet (500 mg total) by mouth 2 (two) times daily as needed. 60 tablet 0    omeprazole 20 MG Oral Capsule Delayed Release Take 1 capsule (20 mg total) by mouth 2 (two) times daily before meals. 40 capsule 0    aspirin 81 MG Oral Tab EC Take 1 tablet (81 mg total) by mouth.      Calcium Carbonate 1500 (600 Ca) MG Oral Tab Take 600 mg by mouth As Directed.      amiodarone 200 MG Oral Tab Take 1 tablet (200 mg total) by mouth daily.         ALLERGIES:   No Known Allergies    REVIEW OF SYSTEMS:   Review of Systems   Constitutional: Negative.    HENT: Negative.    Eyes: Negative.    Respiratory: Negative.    Cardiovascular: Negative.    Gastrointestinal: Negative.    Genitourinary: Negative.    Musculoskeletal: As per HPI  Skin: Negative.    Neurological: As per HPI  Endo/Heme/Allergies: Negative.    Psychiatric/Behavioral: Negative.      All other systems reviewed and are negative. Pertinent positives and negatives noted in the HPI.        PHYSICAL EXAM:   Ht 65\"   Wt 190 lb (86.2 kg)   BMI 31.62 kg/m²     Body mass index is 31.62 kg/m².      General: No immediate distress  Head: Normocephalic/ Atraumatic  Eyes: Extra-occular movements intact.   Ears: No auricular hematoma or deformities  Mouth: No lesions or ulcerations  Heart: peripheral pulses intact. Normal capillary refill.   Lungs: Non-labored respirations  Abdomen: No abdominal guarding  Extremities: No lower extremity edema bilaterally   Skin: No lesions noted.   Cognition: alert & oriented x 3, attentive, able to follow 2 step commands, comprehention intact, spontaneous speech intact  Motor:    Musculoskeletal:    ANKLE/FOOT  Inspection: no erythema, swelling, or obvious deformity.  Palpation: Tender to palpation over the right tarsal tunnel without any paresthesias  ROM: Full active ROM in all  planes of the ankle.   Strength: 5 out of 5 in all myotomes of the lower extremity  Sensation: Intact to light touch in all dermatomes of the lower extremities  Reflexes: 2/4 at L4 and S1  Anterior Drawer: Negative  Talar Tilt: Negative  Velasquez: Negative      Data  No visits with results within 6 Month(s) from this visit.   Latest known visit with results is:   Admission on 11/12/2023, Discharged on 11/12/2023   Component Date Value Ref Range Status    Ventricular rate 11/12/2023 73  BPM Final    Atrial rate 11/12/2023 73  BPM Final    P-R Interval 11/12/2023 178  ms Final    QRS Duration 11/12/2023 90  ms Final    Q-T Interval 11/12/2023 420  ms Final    QTC Calculation (Bezet) 11/12/2023 462  ms Final    P Axis 11/12/2023 48  degrees Final    R Axis 11/12/2023 12  degrees Final    T [place] 11/12/2023 64  degrees Final    Hold Lavender 11/12/2023 Auto Resulted   Final    Hold Lt Green 11/12/2023 Auto Resulted   Final    Hold Blue 11/12/2023 Auto Resulted   Final    Hold Gold 11/12/2023 Auto Resulted   Final    Glucose 11/12/2023 140 (H)  70 - 99 mg/dL Final    Sodium 11/12/2023 139  136 - 145 mmol/L Final    Potassium 11/12/2023 3.6  3.5 - 5.1 mmol/L Final    Chloride 11/12/2023 104  98 - 112 mmol/L Final    CO2 11/12/2023 28.0  21.0 - 32.0 mmol/L Final    Anion Gap 11/12/2023 7  0 - 18 mmol/L Final    BUN 11/12/2023 21  9 - 23 mg/dL Final    Creatinine 11/12/2023 1.18 (H)  0.55 - 1.02 mg/dL Final    BUN/CREA Ratio 11/12/2023 17.8  10.0 - 20.0 Final    Calcium, Total 11/12/2023 9.6  8.7 - 10.4 mg/dL Final    Calculated Osmolality 11/12/2023 293  275 - 295 mOsm/kg Final    eGFR-Cr 11/12/2023 52 (L)  >=60 mL/min/1.73m2 Final    ALT 11/12/2023 11  10 - 49 U/L Final    AST 11/12/2023 14  <=34 U/L Final    Alkaline Phosphatase 11/12/2023 98  50 - 130 U/L Final    Bilirubin, Total 11/12/2023 0.7  0.2 - 1.1 mg/dL Final    Total Protein 11/12/2023 7.5  5.7 - 8.2 g/dL Final    Albumin 11/12/2023 4.7  3.2 - 4.8 g/dL Final     Globulin  11/12/2023 2.8  2.8 - 4.4 g/dL Final    A/G Ratio 11/12/2023 1.7  1.0 - 2.0 Final    Troponin I (High Sensitivity) 11/12/2023 6  <=34 ng/L Final    WBC 11/12/2023 7.8  4.0 - 11.0 x10(3) uL Final    RBC 11/12/2023 4.43  3.80 - 5.30 x10(6)uL Final    HGB 11/12/2023 12.9  12.0 - 16.0 g/dL Final    HCT 11/12/2023 40.6  35.0 - 48.0 % Final    MCV 11/12/2023 91.6  80.0 - 100.0 fL Final    MCH 11/12/2023 29.1  26.0 - 34.0 pg Final    MCHC 11/12/2023 31.8  31.0 - 37.0 g/dL Final    RDW-SD 11/12/2023 46.1  35.1 - 46.3 fL Final    RDW 11/12/2023 13.4  11.0 - 15.0 % Final    PLT 11/12/2023 257.0  150.0 - 450.0 10(3)uL Final    Neutrophil Absolute Prelim 11/12/2023 5.63  1.50 - 7.70 x10 (3) uL Final    Neutrophil Absolute 11/12/2023 5.63  1.50 - 7.70 x10(3) uL Final    Lymphocyte Absolute 11/12/2023 1.69  1.00 - 4.00 x10(3) uL Final    Monocyte Absolute 11/12/2023 0.35  0.10 - 1.00 x10(3) uL Final    Eosinophil Absolute 11/12/2023 0.03  0.00 - 0.70 x10(3) uL Final    Basophil Absolute 11/12/2023 0.03  0.00 - 0.20 x10(3) uL Final    Immature Granulocyte Absolute 11/12/2023 0.02  0.00 - 1.00 x10(3) uL Final    Neutrophil % 11/12/2023 72.6  % Final    Lymphocyte % 11/12/2023 21.8  % Final    Monocyte % 11/12/2023 4.5  % Final    Eosinophil % 11/12/2023 0.4  % Final    Basophil % 11/12/2023 0.4  % Final    Immature Granulocyte % 11/12/2023 0.3  % Final   ]      Radiology Imaging:  NA      ASSESSMENT AND PLAN:  Kaitlyn is a pleasant 63-year-old female who presents with right medial ankle pain with point tenderness over the tarsal tunnel tendons.  I believe her symptoms are due to posterior tibialis tendinopathy and insufficiency which may be related to utilizing new shoes.  I am recommending x-rays of the right ankle, Naprosyn, Tylenol, physical therapy, and a right posterior tibialis tendon sheath corticosteroid injection under ultrasound guidance.  She will follow-up with me in about 6 to 8 weeks.  If her symptoms  continue, then I would recommend an MRI of the right ankle.       RTC in 6 to 8 weeks  Discharge Instructions were provided as documented in AVS summary.  The patient was in agreement with the assessment and plan.  All questions were answered.  There were no barriers to learning.         1. Tibialis posterior tendonitis, right    2. Acute right ankle pain    3. Primary osteoarthritis of left knee    4. Patellofemoral pain syndrome, unspecified laterality    5. Synovial cyst of left popliteal space    6. Class 1 obesity due to excess calories with serious comorbidity and body mass index (BMI) of 30.0 to 30.9 in adult    7. History of MI (myocardial infarction)        Alex B. Behar MD  Physical Medicine and Rehabilitation/Sports Medicine  [facility]

## 2024-09-12 ENCOUNTER — OFFICE VISIT (OUTPATIENT)
Dept: PHYSICAL MEDICINE AND REHAB | Facility: CLINIC | Age: 64
End: 2024-09-12
Payer: MEDICARE

## 2024-09-12 DIAGNOSIS — M76.821 TIBIALIS POSTERIOR TENDONITIS, RIGHT: Primary | ICD-10-CM

## 2024-09-12 DIAGNOSIS — M25.571 ACUTE RIGHT ANKLE PAIN: ICD-10-CM

## 2024-09-12 DIAGNOSIS — M22.2X9 PATELLOFEMORAL PAIN SYNDROME, UNSPECIFIED LATERALITY: ICD-10-CM

## 2024-09-12 DIAGNOSIS — M17.12 PRIMARY OSTEOARTHRITIS OF LEFT KNEE: ICD-10-CM

## 2024-09-12 PROCEDURE — 99213 OFFICE O/P EST LOW 20 MIN: CPT | Performed by: PHYSICAL MEDICINE & REHABILITATION

## 2024-09-12 NOTE — PROGRESS NOTES
[facility]  Progress Note    CHIEF COMPLAINT:    Chief Complaint   Patient presents with    Follow - Up     LOV 7/1/24. Patient presents for right ankle f/u. Pain 0/10. Denies N/T. Denies weakness. HEP daily wih relief.       History of Present Illness:  The patient is a 63 year old female with a significant past medical history of MI and right knee replacement who presents for follow-up with complaints of right medial ankle pain.  This post right posterior tibialis tendon corticosteroid injection on 7/1/2020 for which she found to be tremendously helpful.  She has also been compliant with a home exercise program and has been only using Naprosyn as needed occasionally.  She rates her pain today a 0 out of 10 in the right ankle.  She denies any radicular symptoms including numbness and tingling.    PAST MEDICAL HISTORY:  Past Medical History:    Heart attack (HCC)       SURGICAL HISTORY:  Past Surgical History:   Procedure Laterality Date    Orif radius & ulna fractures Right     Tibia fracture surgery Right        SOCIAL HISTORY:   Social History     Occupational History    Not on file   Tobacco Use    Smoking status: Never    Smokeless tobacco: Never   Vaping Use    Vaping status: Never Used   Substance and Sexual Activity    Alcohol use: Never    Drug use: Never    Sexual activity: Not on file       FAMILY HISTORY:   Family History   Problem Relation Age of Onset    No Known Problems Mother     Diabetes Sister     Diabetes Brother        CURRENT MEDICATIONS:   Current Outpatient Medications   Medication Sig Dispense Refill    naproxen 500 MG Oral Tab Take 1 tablet (500 mg total) by mouth 2 (two) times daily as needed. 60 tablet 0    omeprazole 20 MG Oral Capsule Delayed Release Take 1 capsule (20 mg total) by mouth 2 (two) times daily before meals. 40 capsule 0    aspirin 81 MG Oral Tab EC Take 1 tablet (81 mg total) by mouth.      Calcium Carbonate 1500 (600 Ca) MG  Oral Tab Take 600 mg by mouth As Directed.      amiodarone 200 MG Oral Tab Take 1 tablet (200 mg total) by mouth daily.         ALLERGIES:   No Known Allergies    REVIEW OF SYSTEMS:   Review of Systems   Constitutional: Negative.    HENT: Negative.    Eyes: Negative.    Respiratory: Negative.    Cardiovascular: Negative.    Gastrointestinal: Negative.    Genitourinary: Negative.    Musculoskeletal: As per HPI  Skin: Negative.    Neurological: As per HPI  Endo/Heme/Allergies: Negative.    Psychiatric/Behavioral: Negative.      All other systems reviewed and are negative. Pertinent positives and negatives noted in the HPI.        PHYSICAL EXAM:   There were no vitals taken for this visit.    There is no height or weight on file to calculate BMI.      General: No immediate distress  Head: Normocephalic/ Atraumatic  Eyes: Extra-occular movements intact.   Ears: No auricular hematoma or deformities  Mouth: No lesions or ulcerations  Heart: peripheral pulses intact. Normal capillary refill.   Lungs: Non-labored respirations  Abdomen: No abdominal guarding  Extremities: No lower extremity edema bilaterally   Skin: No lesions noted.   Cognition: alert & oriented x 3, attentive, able to follow 2 step commands, comprehention intact, spontaneous speech intact  Motor:    Musculoskeletal:        Data  No visits with results within 6 Month(s) from this visit.   Latest known visit with results is:   Admission on 11/12/2023, Discharged on 11/12/2023   Component Date Value Ref Range Status    Ventricular rate 11/12/2023 73  BPM Final    Atrial rate 11/12/2023 73  BPM Final    P-R Interval 11/12/2023 178  ms Final    QRS Duration 11/12/2023 90  ms Final    Q-T Interval 11/12/2023 420  ms Final    QTC Calculation (Bezet) 11/12/2023 462  ms Final    P Axis 11/12/2023 48  degrees Final    R Axis 11/12/2023 12  degrees Final    T [place] 11/12/2023 64  degrees Final    Hold Lavender 11/12/2023 Auto Resulted   Final    Hold Lt Green  11/12/2023 Auto Resulted   Final    Hold Blue 11/12/2023 Auto Resulted   Final    Hold Gold 11/12/2023 Auto Resulted   Final    Glucose 11/12/2023 140 (H)  70 - 99 mg/dL Final    Sodium 11/12/2023 139  136 - 145 mmol/L Final    Potassium 11/12/2023 3.6  3.5 - 5.1 mmol/L Final    Chloride 11/12/2023 104  98 - 112 mmol/L Final    CO2 11/12/2023 28.0  21.0 - 32.0 mmol/L Final    Anion Gap 11/12/2023 7  0 - 18 mmol/L Final    BUN 11/12/2023 21  9 - 23 mg/dL Final    Creatinine 11/12/2023 1.18 (H)  0.55 - 1.02 mg/dL Final    BUN/CREA Ratio 11/12/2023 17.8  10.0 - 20.0 Final    Calcium, Total 11/12/2023 9.6  8.7 - 10.4 mg/dL Final    Calculated Osmolality 11/12/2023 293  275 - 295 mOsm/kg Final    eGFR-Cr 11/12/2023 52 (L)  >=60 mL/min/1.73m2 Final    ALT 11/12/2023 11  10 - 49 U/L Final    AST 11/12/2023 14  <=34 U/L Final    Alkaline Phosphatase 11/12/2023 98  50 - 130 U/L Final    Bilirubin, Total 11/12/2023 0.7  0.2 - 1.1 mg/dL Final    Total Protein 11/12/2023 7.5  5.7 - 8.2 g/dL Final    Albumin 11/12/2023 4.7  3.2 - 4.8 g/dL Final    Globulin  11/12/2023 2.8  2.8 - 4.4 g/dL Final    A/G Ratio 11/12/2023 1.7  1.0 - 2.0 Final    Troponin I (High Sensitivity) 11/12/2023 6  <=34 ng/L Final    WBC 11/12/2023 7.8  4.0 - 11.0 x10(3) uL Final    RBC 11/12/2023 4.43  3.80 - 5.30 x10(6)uL Final    HGB 11/12/2023 12.9  12.0 - 16.0 g/dL Final    HCT 11/12/2023 40.6  35.0 - 48.0 % Final    MCV 11/12/2023 91.6  80.0 - 100.0 fL Final    MCH 11/12/2023 29.1  26.0 - 34.0 pg Final    MCHC 11/12/2023 31.8  31.0 - 37.0 g/dL Final    RDW-SD 11/12/2023 46.1  35.1 - 46.3 fL Final    RDW 11/12/2023 13.4  11.0 - 15.0 % Final    PLT 11/12/2023 257.0  150.0 - 450.0 10(3)uL Final    Neutrophil Absolute Prelim 11/12/2023 5.63  1.50 - 7.70 x10 (3) uL Final    Neutrophil Absolute 11/12/2023 5.63  1.50 - 7.70 x10(3) uL Final    Lymphocyte Absolute 11/12/2023 1.69  1.00 - 4.00 x10(3) uL Final    Monocyte Absolute 11/12/2023 0.35  0.10 - 1.00  x10(3) uL Final    Eosinophil Absolute 11/12/2023 0.03  0.00 - 0.70 x10(3) uL Final    Basophil Absolute 11/12/2023 0.03  0.00 - 0.20 x10(3) uL Final    Immature Granulocyte Absolute 11/12/2023 0.02  0.00 - 1.00 x10(3) uL Final    Neutrophil % 11/12/2023 72.6  % Final    Lymphocyte % 11/12/2023 21.8  % Final    Monocyte % 11/12/2023 4.5  % Final    Eosinophil % 11/12/2023 0.4  % Final    Basophil % 11/12/2023 0.4  % Final    Immature Granulocyte % 11/12/2023 0.3  % Final   ]      Radiology Imaging:  NA    ASSESSMENT AND PLAN:  Kaitlyn is a pleasant 63-year-old female presents for follow-up of her right medial ankle pain due to posterior tibialis tendinopathy and insufficiency.  She is doing very well following the corticosteroid injection and home exercise program.  She can continue using Tylenol as needed for pain and follow-up with me as needed.       RTC in as needed  Discharge Instructions were provided as documented in AVS summary.  The patient was in agreement with the assessment and plan.  All questions were answered.  There were no barriers to learning.         1. Tibialis posterior tendonitis, right    2. Acute right ankle pain    3. Primary osteoarthritis of left knee    4. Patellofemoral pain syndrome, unspecified laterality        Alex B. Behar MD  Physical Medicine and Rehabilitation/Sports Medicine  [facility]

## 2024-12-31 ENCOUNTER — HOSPITAL ENCOUNTER (OUTPATIENT)
Dept: MAMMOGRAPHY | Age: 64
Discharge: HOME | End: 2024-12-31
Attending: FAMILY MEDICINE
Payer: MEDICARE

## 2024-12-31 DIAGNOSIS — Z12.31 ENCOUNTER FOR SCREENING MAMMOGRAM FOR MALIGNANT NEOPLASM OF BREAST: ICD-10-CM

## 2024-12-31 PROCEDURE — 77063 BREAST TOMOSYNTHESIS BI: CPT | Performed by: FAMILY MEDICINE

## 2024-12-31 PROCEDURE — 77067 SCR MAMMO BI INCL CAD: CPT | Performed by: FAMILY MEDICINE

## 2025-05-12 ENCOUNTER — HOSPITAL ENCOUNTER (OUTPATIENT)
Age: 65
Discharge: HOME | End: 2025-05-12
Payer: MEDICARE

## 2025-05-12 ENCOUNTER — APPOINTMENT (OUTPATIENT)
Dept: GENERAL RADIOLOGY | Age: 65
End: 2025-05-12
Attending: PHYSICIAN ASSISTANT
Payer: MEDICARE

## 2025-05-12 VITALS
RESPIRATION RATE: 16 BRPM | HEART RATE: 72 BPM | DIASTOLIC BLOOD PRESSURE: 66 MMHG | OXYGEN SATURATION: 100 % | TEMPERATURE: 98 F | SYSTOLIC BLOOD PRESSURE: 155 MMHG

## 2025-05-12 DIAGNOSIS — R03.0 ELEVATED BLOOD PRESSURE READING: ICD-10-CM

## 2025-05-12 DIAGNOSIS — S43.402A SPRAIN OF LEFT SHOULDER, UNSPECIFIED SHOULDER SPRAIN TYPE, INITIAL ENCOUNTER: Primary | ICD-10-CM

## 2025-05-12 PROCEDURE — 73030 X-RAY EXAM OF SHOULDER: CPT | Performed by: PHYSICIAN ASSISTANT

## 2025-05-12 PROCEDURE — 73060 X-RAY EXAM OF HUMERUS: CPT | Performed by: PHYSICIAN ASSISTANT

## 2025-05-12 PROCEDURE — 99213 OFFICE O/P EST LOW 20 MIN: CPT | Performed by: PHYSICIAN ASSISTANT

## 2025-05-12 RX ORDER — ACETAMINOPHEN 325 MG/1
650 TABLET ORAL
Qty: 40 TABLET | Refills: 0 | Status: SHIPPED | OUTPATIENT
Start: 2025-05-12

## 2025-05-12 NOTE — ED INITIAL ASSESSMENT (HPI)
Pt states she was turning and lost her balance and fell, landing on her left shoulder. Denies hitting head/loc. No obvious deformity noted. + cms.

## 2025-05-12 NOTE — ED PROVIDER NOTES
Patient Seen in: [facility]    History     Chief Complaint   Patient presents with    Shoulder Injury     Stated Complaint: L shoulder Pain from Fall    HPI    HPI: Kaitlyn Mcbride is a 64 year old female who presents with chief complaint of right shoulder pain and right upper arm pain.  Onset this morning.  Patient states that she sustained mechanical fall, landing on her left shoulder.  Patient denies other injury, head/neck injury or pain, open wound, decreased range of motion, swelling, ecchymosis, erythema, weakness, paraesthesias.      Past Medical History[1]    Past Surgical History[2]         Family History[3]    Short Social Hx on File[4]    Review of Systems    Positive for stated complaint: L shoulder Pain from Fall  Other systems are as noted in HPI.  Constitutional and vital signs reviewed.      All other systems reviewed and negative except as noted above.    PSFH elements reviewed from today and agreed except as otherwise stated in HPI.    Physical Exam     ED Triage Vitals [05/12/25 1202]   /66   Pulse 72   Resp 16   Temp 98.3 °F (36.8 °C)   Temp src Oral   SpO2 100 %   O2 Device None (Room air)       Current:/66   Pulse 72   Temp 98.3 °F (36.8 °C) (Oral)   Resp 16   SpO2 100%     PULSE OX within normal limits on room air as interpreted by this provider.    Physical Exam      Constitutional: The patient is cooperative. Appears well-developed and well-nourished.  Mild discomfort.  Psychological: Alert, No abnormalities of mood, affect.  Head: Normocephalic/atraumatic.  Eyes: Pupils are equal round reactive to light.  Conjunctiva are within normal limits.  ENT: Oropharynx is clear.  Neck: The neck is supple.  No meningeal signs.  Chest: There is no tenderness to the chest wall.  Respiratory: Respiratory effort was normal.  There is no rales, wheezes, or rhonchi.  There is no stridor.  Air entry is equal.  Cardiovascular: Regular rate and rhythm.  Capillary refill is brisk.    Genitourinary: Not examined.  Lymphatic: No gross lymphadenopathy noted.  Musculoskeletal: Left upper extremity-Left upper extremity normal to inspection without acute bony deformity.  Positive tenderness to palpation present at left anterior shoulder and left mid upper arm.  Remainder of left upper extremity nontender to palpation.  Full range of motion of left shoulder with reported pain.  Distal pulses intact.  Capillary refill normal.  Motor intact distally.  Sensory intact distally.  No ecchymosis.  No erythema.  No swelling.  No open wounds.  No compartment syndrome.  No edema.  Remainder of musculoskeletal system is grossly intact.  There is no obvious deformity.  Neurological: Gross motor movement is intact in all 4 extremities.  Patient exhibits normal speech.  Skin: Skin is normal to inspection, except as documented.  Warm and dry.  No obvious rash.        ED Course   Labs Reviewed - No data to display    MDM     Differential diagnosis prior to work-up including but not limited to fracture, strain/sprain, contusion    Radiology findings: XR HUMERUS (MIN 2 VIEWS), LEFT (CPT=73060)  Result Date: 5/12/2025  CONCLUSION: No acute fracture or dislocation.    Dictated by (CST): Dejon Oreilly MD on 5/12/2025 at 12:56 PM     Finalized by (CST): Dejon Oreilly MD on 5/12/2025 at 12:57 PM          XR SHOULDER, COMPLETE (MIN 2 VIEWS), LEFT (CPT=73030)  Result Date: 5/12/2025  CONCLUSION: No acute fracture or dislocation.    Dictated by (CST): Dejon Oreilly MD on 5/12/2025 at 12:55 PM     Finalized by (CST): Dejon Oreilly MD on 5/12/2025 at 12:56 PM          X-ray images of left humerus independently viewed by this provider-no acute fracture.  X-ray images of left shoulder independently viewed by this provider-no acute fracture.    Physical exam remained stable as previously documented.  Available results reviewed with patient.    I have given the patient instructions regarding their diagnoses,  expectations, follow up, and ER precautions. I explained to the patient that emergent conditions may arise and to go to the ER for new, worsening or any persistent conditions. I've explained the importance of following up with their doctor as instructed. The patient verbalized understanding of the discharge instructions and plan.    The patient was informed of their elevated blood pressure reading at immediate care.  They were informed of the dangers of undiagnosed and untreated hypertension.  Education regarding lifestyle modifications and the need for appropriate follow-up with their PCP to have their blood pressure re-checked within 24-48 hours was provided.    Disposition and Plan     Clinical Impression:  1. Sprain of left shoulder, unspecified shoulder sprain type, initial encounter    2. Elevated blood pressure reading        Disposition:  Discharge    Follow-up:  Pal Krishnan MD  [address] [place], [place] [id number]  [phone]    Call in 1 day  For follow-up    To schedule an appointment with the Orthopedic and Sports Medicine department; please text or call [phone] and choose option 3 when prompted.    Call today        Medications Prescribed:  Current Discharge Medication List        START taking these medications    Details   acetaminophen 325 MG Oral Tab Take 2 tablets (650 mg total) by mouth every 4 to 6 hours as needed for Pain or Fever.  Qty: 40 tablet, Refills: 0                            [1]   Past Medical History:   Heart attack (HCC)   [2]   Past Surgical History:  Procedure Laterality Date    Orif radius & ulna fractures Right     Tibia fracture surgery Right    [3]   Family History  Problem Relation Age of Onset    No Known Problems Mother     Diabetes Sister     Diabetes Brother    [4]   Social History  Socioeconomic History    Marital status: Single   Tobacco Use    Smoking status: Never    Smokeless tobacco: Never   Vaping Use    Vaping status: Never Used   Substance and Sexual  Activity    Alcohol use: Never    Drug use: Never   Other Topics Concern    Caffeine Concern Yes     Comment: daily coffee    Exercise No   Social History Narrative    The patient does not use an assistive device..      The patient does live in a home with stairs.

## 2025-05-28 ENCOUNTER — TELEPHONE (OUTPATIENT)
Dept: PHYSICAL THERAPY | Facility: HOSPITAL | Age: 65
End: 2025-05-28

## 2025-05-28 ENCOUNTER — OFFICE VISIT (OUTPATIENT)
Dept: PHYSICAL MEDICINE AND REHAB | Facility: CLINIC | Age: 65
End: 2025-05-28
Payer: MEDICARE

## 2025-05-28 VITALS — BODY MASS INDEX: 32 KG/M2 | WEIGHT: 195 LBS

## 2025-05-28 DIAGNOSIS — M75.42 CORACOID IMPINGEMENT OF LEFT SHOULDER: ICD-10-CM

## 2025-05-28 DIAGNOSIS — M67.919 TENDINOPATHY OF ROTATOR CUFF, UNSPECIFIED LATERALITY: ICD-10-CM

## 2025-05-28 DIAGNOSIS — M75.42 SUBACROMIAL IMPINGEMENT, LEFT: ICD-10-CM

## 2025-05-28 DIAGNOSIS — S46.012A TRAUMATIC TEAR OF LEFT ROTATOR CUFF, UNSPECIFIED TEAR EXTENT, INITIAL ENCOUNTER: ICD-10-CM

## 2025-05-28 DIAGNOSIS — M25.512 ACUTE PAIN OF LEFT SHOULDER: Primary | ICD-10-CM

## 2025-05-28 PROCEDURE — 20610 DRAIN/INJ JOINT/BURSA W/O US: CPT | Performed by: PHYSICAL MEDICINE & REHABILITATION

## 2025-05-28 PROCEDURE — 99214 OFFICE O/P EST MOD 30 MIN: CPT | Performed by: PHYSICAL MEDICINE & REHABILITATION

## 2025-05-28 RX ORDER — LIDOCAINE HYDROCHLORIDE 10 MG/ML
7 INJECTION, SOLUTION INFILTRATION; PERINEURAL ONCE
Status: COMPLETED | OUTPATIENT
Start: 2025-05-28 | End: 2025-05-28

## 2025-05-28 RX ORDER — TRIAMCINOLONE ACETONIDE 40 MG/ML
40 INJECTION, SUSPENSION INTRA-ARTICULAR; INTRAMUSCULAR ONCE
Status: COMPLETED | OUTPATIENT
Start: 2025-05-28 | End: 2025-05-28

## 2025-05-28 NOTE — PATIENT INSTRUCTIONS
1) Please call and schedule your MRI of the LFT shoulder at [phone].  Once you have your MRI scheduled, then call my office again to schedule a follow-up visit soon after your MRI so we may review the images together.  2) Please begin physical therapy as soon as possible.   3) My office will call you to schedule the LEFT subacromial Csi once the procedure is approved by your insurance carrier.    4)  Ice 20 minutes at a time 3-4 times per day  5) Tylenol 500-1000 mg every 6-8 hours as needed for pain.  No more than 3000 mg daily.  6) Follow up with me to review the MRI images and discus next steps which may include surgical consultation vs continued therapy      Instrucciones posteriores a la inyección   No ayaan nada extenuante michele los próximos dos días (si [facility]ibió [facility], no camine más de 2 stiven de la ciudad, no asista a clases de aeróbicos, no corra, no levante objetos pesados, no permanezca de pie por mucho tiempo).  -Puede reanudar luis fernando actividades cotidianas después de la inyección.  -Es posible que experimente ashley leve hinchazón después del procedimiento.  -Coloque hielo en la articulación que se inyectó al menos 5 a 6 veces al día (15 minutos) michele dos días después (esto ayudará a prevenir el empeoramiento del dolor que a veces ocurre después de ashley inyección).  -Solo tome tylenol si es necesario para el dolor michele los primeros días.  -Esté atento a los signos de infección que incluyen enrojecimiento, calor, empeoramiento del dolor, fiebre o escalofríos. Si desarrolla alguno de estos signos, llame a la oficina de inmediato al [phone]  -Todos responden a chapa manera diferente a las inyecciones, mee puede esperar luis fernando efectos máximos unas semanas después de chapa última inyección.  Alex B. Behar MD  Physical Medicine and Rehabilitation/Sports Medicine  [facility]

## 2025-05-28 NOTE — PROCEDURES
[facility]   Shoulder Injection Procedure Note    CHIEF COMPLAINT:    Chief Complaint   Patient presents with    Shoulder Pain     Patient is here for L shoulder and L upper arm pain. Patient fell on L side on 05/12/25 after tripping on a shoe rack. She went to the  and had xrays done the same day. No imaging or injections. Denies t/n. Pain when moving L arm. Pain is in L lateral shoulder. Pain when trying to lift arm. Pain 5/10. Tylenol 1000mg daily.        PROCEDURE PERFORMED:  Left subacromial corticosteroid injection     INDICATIONS:  Left shoulder pain    PRIMARY PROCEDURALIST:  Alex Behar, MD    INFORMED CONSENT & TIME OUT:   As documented in the Time Out and Pre-Procedure Check Lists.  Verbal consent was obtained    Vitals: [unfilled]  Labs (document last wbc, plts, hgb, and PT/INR):   No visits with results within 6 Month(s) from this visit.   Latest known visit with results is:   Admission on 11/12/2023, Discharged on 11/12/2023   Component Date Value Ref Range Status    Ventricular rate 11/12/2023 73  BPM Final    Atrial rate 11/12/2023 73  BPM Final    P-R Interval 11/12/2023 178  ms Final    QRS Duration 11/12/2023 90  ms Final    Q-T Interval 11/12/2023 420  ms Final    QTC Calculation (Bezet) 11/12/2023 462  ms Final    P Axis 11/12/2023 48  degrees Final    R Axis 11/12/2023 12  degrees Final    T [place] 11/12/2023 64  degrees Final    Hold Lavender 11/12/2023 Auto Resulted   Final    Hold Lt Green 11/12/2023 Auto Resulted   Final    Hold Blue 11/12/2023 Auto Resulted   Final    Hold Gold 11/12/2023 Auto Resulted   Final    Glucose 11/12/2023 140 (H)  70 - 99 mg/dL Final    Sodium 11/12/2023 139  136 - 145 mmol/L Final    Potassium 11/12/2023 3.6  3.5 - 5.1 mmol/L Final    Chloride 11/12/2023 104  98 - 112 mmol/L Final    CO2 11/12/2023 28.0  21.0 - 32.0 mmol/L Final    Anion Gap 11/12/2023 7  0 - 18 mmol/L Final    BUN 11/12/2023 21  9 - 23 mg/dL Final     Creatinine 11/12/2023 1.18 (H)  0.55 - 1.02 mg/dL Final    BUN/CREA Ratio 11/12/2023 17.8  10.0 - 20.0 Final    Calcium, Total 11/12/2023 9.6  8.7 - 10.4 mg/dL Final    Calculated Osmolality 11/12/2023 293  275 - 295 mOsm/kg Final    eGFR-Cr 11/12/2023 52 (L)  >=60 mL/min/1.73m2 Final    ALT 11/12/2023 11  10 - 49 U/L Final    AST 11/12/2023 14  <=34 U/L Final    Alkaline Phosphatase 11/12/2023 98  50 - 130 U/L Final    Bilirubin, Total 11/12/2023 0.7  0.2 - 1.1 mg/dL Final    Total Protein 11/12/2023 7.5  5.7 - 8.2 g/dL Final    Albumin 11/12/2023 4.7  3.2 - 4.8 g/dL Final    Globulin  11/12/2023 2.8  2.8 - 4.4 g/dL Final    A/G Ratio 11/12/2023 1.7  1.0 - 2.0 Final    Troponin I (High Sensitivity) 11/12/2023 6  <=34 ng/L Final    WBC 11/12/2023 7.8  4.0 - 11.0 x10(3) uL Final    RBC 11/12/2023 4.43  3.80 - 5.30 x10(6)uL Final    HGB 11/12/2023 12.9  12.0 - 16.0 g/dL Final    HCT 11/12/2023 40.6  35.0 - 48.0 % Final    MCV 11/12/2023 91.6  80.0 - 100.0 fL Final    MCH 11/12/2023 29.1  26.0 - 34.0 pg Final    MCHC 11/12/2023 31.8  31.0 - 37.0 g/dL Final    RDW-SD 11/12/2023 46.1  35.1 - 46.3 fL Final    RDW 11/12/2023 13.4  11.0 - 15.0 % Final    PLT 11/12/2023 257.0  150.0 - 450.0 10(3)uL Final    Neutrophil Absolute Prelim 11/12/2023 5.63  1.50 - 7.70 x10 (3) uL Final    Neutrophil Absolute 11/12/2023 5.63  1.50 - 7.70 x10(3) uL Final    Lymphocyte Absolute 11/12/2023 1.69  1.00 - 4.00 x10(3) uL Final    Monocyte Absolute 11/12/2023 0.35  0.10 - 1.00 x10(3) uL Final    Eosinophil Absolute 11/12/2023 0.03  0.00 - 0.70 x10(3) uL Final    Basophil Absolute 11/12/2023 0.03  0.00 - 0.20 x10(3) uL Final    Immature Granulocyte Absolute 11/12/2023 0.02  0.00 - 1.00 x10(3) uL Final    Neutrophil % 11/12/2023 72.6  % Final    Lymphocyte % 11/12/2023 21.8  % Final    Monocyte % 11/12/2023 4.5  % Final    Eosinophil % 11/12/2023 0.4  % Final    Basophil % 11/12/2023 0.4  % Final    Immature Granulocyte % 11/12/2023 0.3  %  Final   ]    PROCEDURE:    The risks and benefits of intraarticular corticosteroid injection were again explained to the patient and verbal consent was obtained. The Left shoulder was prepped and draped in the usual sterile fashion. A 25 G 1.5-inch needle was introduced into the Left subacromial space in a posterolateral approach while injecting 1.5 cc of 1% lidocain. Aspiration was attempted and there was no fluid able to be aspirated. We switched the syringe to one containing 4 mL of 1% lidocaine and 1 mL of 40 mg/mL Kenalog and it was injected.  The needle was then removed and hemostasis was obtained.  The skin site was cleansed and a clean dressing was applied.  The patient tolerated the procedure well.     Patient verbalized understanding of assessment and plan.  Patient is in agreement with the plan.  All questions were answered.  No barriers to learning identified.       INSTRUCTIONS GIVEN TO PATIENT:    \"You will see an effect in the next 2-3 days.  Please contact me if you have fevers, worsening swelling, worsening pain, decreased range of motion, increased redness, chills, or anything that makes you concerned about how the joint we injected feels/looks.  If you do not reach me in a reasonable time, please report directly to the emergency room for further evaluation\"      Alex B. Behar MD, [facility]  Physical Medicine and Rehabilitation/Sports Medicine  [facility]

## 2025-05-28 NOTE — PROGRESS NOTES
[facility]  Progress Note    CHIEF COMPLAINT:    Chief Complaint   Patient presents with    Shoulder Pain     Patient is here for L shoulder and L upper arm pain. Patient fell on L side on 05/12/25 after tripping on a shoe rack. She went to the  and had xrays done the same day. No imaging or injections. Denies t/n. Pain when moving L arm. Pain is in L lateral shoulder. Pain when trying to lift arm. Pain 5/10. Tylenol 1000mg daily.        History of Present Illness  Kaitlyn Mcbride is a 64 year old female who presents with shoulder pain following a fall.    She experienced shoulder pain after a fall on May 12, 2025. The pain is localized to the shoulder without radiation down the arm. It is rated as a 5 out of 10 after taking Tylenol, and it increases when she does not take the medication. She has been taking Tylenol 500 mg, two tablets daily, since the fall.    The pain is exacerbated by raising her arm, affecting her ability to perform daily activities such as combing her hair and unfastening her bra. She cannot lift her arm without discomfort and has difficulty maintaining certain positions due to pain.    She has been unable to perform certain tasks independently due to the pain and weakness in the shoulder.    She engages in physical activities such as going to the gym but does not lift weights.       PAST MEDICAL HISTORY:  Past Medical History[1]    SURGICAL HISTORY:  Past Surgical History[2]    SOCIAL HISTORY:   Social History     Occupational History    Not on file   Tobacco Use    Smoking status: Never    Smokeless tobacco: Never   Vaping Use    Vaping status: Never Used   Substance and Sexual Activity    Alcohol use: Never    Drug use: Never    Sexual activity: Not on file       FAMILY HISTORY:   Family History[3]    CURRENT MEDICATIONS:   Current Medications[4]    ALLERGIES:   Allergies[5]    REVIEW OF SYSTEMS:   Review of Systems   Constitutional:  Negative.    HENT: Negative.    Eyes: Negative.    Respiratory: Negative.    Cardiovascular: Negative.    Gastrointestinal: Negative.    Genitourinary: Negative.    Musculoskeletal: As per HPI  Skin: Negative.    Neurological: As per HPI  Endo/Heme/Allergies: Negative.    Psychiatric/Behavioral: Negative.      All other systems reviewed and are negative. Pertinent positives and negatives noted in the HPI.    PHYSICAL EXAM:   Wt 195 lb (88.5 kg)   BMI 32.45 kg/m²     Body mass index is 32.45 kg/m².      General: No immediate distress  Head: Normocephalic/ Atraumatic  Eyes: Extra-occular movements intact.   Ears: No auricular hematoma or deformities  Mouth: No lesions or ulcerations  Heart: peripheral pulses intact. Normal capillary refill.   Lungs: Non-labored respirations  Abdomen: No abdominal guarding  Extremities: No lower extremity edema bilaterally   Skin: No lesions noted.   Cognition: alert & oriented x 3, attentive, able to follow 2 step commands, comprehension intact, spontaneous speech intact  Motor:    Musculoskeletal:    SHOULDER:  Inspection: no erythema, swelling, or obvious deformity.  No AC joint deformity  Palpation: no ttp over clavical, AC joint, or scapular spine.  Tender to palpation over the left subacromial space and greater tuberosity  ROM: Full passive range of motion of the left shoulder  Strength: 5 out of 5 in all myotomes of the upper extremities except for weakness during left shoulder abduction  Sensation: Intact to light touch in all dermatomes of the upper extremities  Reflexes: 2/4 at C5, C6, C7  Impingement Testing: Positive  Hawkin's test: Positive   Cross Arm Test: negative for AC joint pain  Speed's Test: Positive on the left  Yergason Test: negative for biceps tendon pain  Empty Can Test: Positive on the left  Push Off Test: Positive on the left        Data  No visits with results within 6 Month(s) from this visit.   Latest known visit with results is:   Admission on  11/12/2023, Discharged on 11/12/2023   Component Date Value Ref Range Status    Ventricular rate 11/12/2023 73  BPM Final    Atrial rate 11/12/2023 73  BPM Final    P-R Interval 11/12/2023 178  ms Final    QRS Duration 11/12/2023 90  ms Final    Q-T Interval 11/12/2023 420  ms Final    QTC Calculation (Bezet) 11/12/2023 462  ms Final    P Axis 11/12/2023 48  degrees Final    R Axis 11/12/2023 12  degrees Final    T [place] 11/12/2023 64  degrees Final    Hold Lavender 11/12/2023 Auto Resulted   Final    Hold Lt Green 11/12/2023 Auto Resulted   Final    Hold Blue 11/12/2023 Auto Resulted   Final    Hold Gold 11/12/2023 Auto Resulted   Final    Glucose 11/12/2023 140 (H)  70 - 99 mg/dL Final    Sodium 11/12/2023 139  136 - 145 mmol/L Final    Potassium 11/12/2023 3.6  3.5 - 5.1 mmol/L Final    Chloride 11/12/2023 104  98 - 112 mmol/L Final    CO2 11/12/2023 28.0  21.0 - 32.0 mmol/L Final    Anion Gap 11/12/2023 7  0 - 18 mmol/L Final    BUN 11/12/2023 21  9 - 23 mg/dL Final    Creatinine 11/12/2023 1.18 (H)  0.55 - 1.02 mg/dL Final    BUN/CREA Ratio 11/12/2023 17.8  10.0 - 20.0 Final    Calcium, Total 11/12/2023 9.6  8.7 - 10.4 mg/dL Final    Calculated Osmolality 11/12/2023 293  275 - 295 mOsm/kg Final    eGFR-Cr 11/12/2023 52 (L)  >=60 mL/min/1.73m2 Final    ALT 11/12/2023 11  10 - 49 U/L Final    AST 11/12/2023 14  <=34 U/L Final    Alkaline Phosphatase 11/12/2023 98  50 - 130 U/L Final    Bilirubin, Total 11/12/2023 0.7  0.2 - 1.1 mg/dL Final    Total Protein 11/12/2023 7.5  5.7 - 8.2 g/dL Final    Albumin 11/12/2023 4.7  3.2 - 4.8 g/dL Final    Globulin  11/12/2023 2.8  2.8 - 4.4 g/dL Final    A/G Ratio 11/12/2023 1.7  1.0 - 2.0 Final    Troponin I (High Sensitivity) 11/12/2023 6  <=34 ng/L Final    WBC 11/12/2023 7.8  4.0 - 11.0 x10(3) uL Final    RBC 11/12/2023 4.43  3.80 - 5.30 x10(6)uL Final    HGB 11/12/2023 12.9  12.0 - 16.0 g/dL Final    HCT 11/12/2023 40.6  35.0 - 48.0 % Final    MCV 11/12/2023 91.6  80.0 -  100.0 fL Final    MCH 11/12/2023 29.1  26.0 - 34.0 pg Final    MCHC 11/12/2023 31.8  31.0 - 37.0 g/dL Final    RDW-SD 11/12/2023 46.1  35.1 - 46.3 fL Final    RDW 11/12/2023 13.4  11.0 - 15.0 % Final    PLT 11/12/2023 257.0  150.0 - 450.0 10(3)uL Final    Neutrophil Absolute Prelim 11/12/2023 5.63  1.50 - 7.70 x10 (3) uL Final    Neutrophil Absolute 11/12/2023 5.63  1.50 - 7.70 x10(3) uL Final    Lymphocyte Absolute 11/12/2023 1.69  1.00 - 4.00 x10(3) uL Final    Monocyte Absolute 11/12/2023 0.35  0.10 - 1.00 x10(3) uL Final    Eosinophil Absolute 11/12/2023 0.03  0.00 - 0.70 x10(3) uL Final    Basophil Absolute 11/12/2023 0.03  0.00 - 0.20 x10(3) uL Final    Immature Granulocyte Absolute 11/12/2023 0.02  0.00 - 1.00 x10(3) uL Final    Neutrophil % 11/12/2023 72.6  % Final    Lymphocyte % 11/12/2023 21.8  % Final    Monocyte % 11/12/2023 4.5  % Final    Eosinophil % 11/12/2023 0.4  % Final    Basophil % 11/12/2023 0.4  % Final    Immature Granulocyte % 11/12/2023 0.3  % Final   ]      Radiology Imaging:  I reviewed with the patient her X-ray of the shoulder left   XR HUMERUS (MIN 2 VIEWS), LEFT (CPT=73060)  Narrative: PROCEDURE: XR HUMERUS (MIN 2 VIEWS), LEFT (CPT=73060)     COMPARISON: None.     INDICATIONS: Left humeral pain post fall today.     TECHNIQUE: Two views were obtained.       FINDINGS:   BONES: No acute fracture or dislocation.  No significant degenerative change.  SOFT TISSUES: Negative. No visible soft tissue swelling.   EFFUSION: None visible.   OTHER: Negative.               Impression: CONCLUSION: No acute fracture or dislocation.           Dictated by (CST): Dejon Oreilly MD on 5/12/2025 at 12:56 PM       Finalized by (CST): Dejon Oreilly MD on 5/12/2025 at 12:57 PM          XR SHOULDER, COMPLETE (MIN 2 VIEWS), LEFT (CPT=73030)  Narrative: PROCEDURE: XR SHOULDER, COMPLETE (MIN 2 VIEWS), LEFT (CPT=73030)     COMPARISON: None.     INDICATIONS: Left shoulder pain post fall today.      TECHNIQUE: 3 views were obtained.       FINDINGS:   BONES: No acute fracture or dislocation.  No evidence of significant degenerative change.  SOFT TISSUES: Negative. No visible soft tissue swelling.   EFFUSION: None visible.   OTHER: Negative.               Impression: CONCLUSION: No acute fracture or dislocation.           Dictated by (CST): Dejon Oreilly MD on 5/12/2025 at 12:55 PM       Finalized by (CST): Dejon Oreilly MD on 5/12/2025 at 12:56 PM              ASSESSMENT AND PLAN:  Kaitlyn is a pleasant 64-year-old female presents with left left shoulder pain which I believe is due to a rotator cuff tear after falling and landing on the left shoulder 2 weeks ago.  I am recommending an MRI of the left shoulder.  I have reviewed her x-ray of the left shoulder.  If it is a traumatic tear, then this would better be suited with surgical consultation.  I have also recommended she begin formal physical therapy in the meantime to prevent adhesive capsulitis.  She should use Tylenol for pain.  I also performed a left subacromial injection today.  Please see separate procedure note.     Assessment & Plan  Right shoulder pain  Pain following fall, exacerbated by movement. X-rays normal. Possible rotator cuff injury or impingement.  - Order MRI to assess tendon damage and evaluate for rotator cuff injury and impingement.  - Initiate physical therapy for shoulder function and range of motion.  - Administer left subacromial injection for pain and inflammation.  - Advise continued Tylenol use and ice application.  - Follow up to review MRI results and discuss further management, including potential surgical consultation.      RTC in 4 to 6 weeks for MRI review  Discharge Instructions were provided as documented in AVS summary.  The patient was in agreement with the assessment and plan.  All questions were answered.  There were no barriers to learning.         1. Acute pain of left shoulder    2. Tendinopathy of rotator  cuff, unspecified laterality    3. Traumatic tear of left rotator cuff, unspecified tear extent, initial encounter    4. Coracoid impingement of left shoulder    5. Subacromial impingement, left        Alex B. Behar MD  Physical Medicine and Rehabilitation/Sports Medicine  [address] Cures Act Notice to Patient: Medical documents like this are made available to patients in the interest of transparency. However, be advised this is a medical document and it is intended as [id number] communication between your medical providers. This medical document may contain abbreviations, assessments, medical data, and results or other terms that are unfamiliar. Medical documents are intended to carry relevant information, facts as evident, and the clinical opinion of the practitioner. As such, this medical document may be written in language that appears blunt or direct. You are encouraged to contact your medical provider and/or [facility] if you have any questions about this medical document.   [employer] Ants tool was used for dictation purposes only and the patient was not recorded at any point during the visit.              [1]   Past Medical History:   Heart attack (HCC)   [2]   Past Surgical History:  Procedure Laterality Date    Orif radius & ulna fractures Right     Tibia fracture surgery Right    [3]   Family History  Problem Relation Age of Onset    No Known Problems Mother     Diabetes Sister     Diabetes Brother    [4]   Current Outpatient Medications   Medication Sig Dispense Refill    acetaminophen 325 MG Oral Tab Take 2 tablets (650 mg total) by mouth every 4 to 6 hours as needed for Pain or Fever. 40 tablet 0    omeprazole 20 MG Oral Capsule Delayed Release Take 1 capsule (20 mg total) by mouth 2 (two) times daily before meals. 40 capsule 0    aspirin 81 MG Oral Tab EC Take 1 tablet (81 mg total) by mouth.      Calcium Carbonate 1500 (600 Ca) MG Oral Tab Take  600 mg by mouth As Directed.      amiodarone 200 MG Oral Tab Take 1 tablet (200 mg total) by mouth daily.     [5] No Known Allergies

## 2025-05-29 ENCOUNTER — TELEPHONE (OUTPATIENT)
Dept: PHYSICAL MEDICINE AND REHAB | Facility: CLINIC | Age: 65
End: 2025-05-29

## 2025-05-29 NOTE — TELEPHONE ENCOUNTER
Initiated Left Subacromial Injection with CPT Code: 86874,  with site Availity.   Status: No authorization required.      Injection done in office.

## 2025-06-02 ENCOUNTER — OFFICE VISIT (OUTPATIENT)
Dept: PHYSICAL THERAPY | Facility: HOSPITAL | Age: 65
End: 2025-06-02
Attending: PHYSICAL MEDICINE & REHABILITATION
Payer: MEDICARE

## 2025-06-02 DIAGNOSIS — M25.512 ACUTE PAIN OF LEFT SHOULDER: Primary | ICD-10-CM

## 2025-06-02 DIAGNOSIS — S46.012A TRAUMATIC TEAR OF LEFT ROTATOR CUFF, UNSPECIFIED TEAR EXTENT, INITIAL ENCOUNTER: ICD-10-CM

## 2025-06-02 DIAGNOSIS — M75.42 CORACOID IMPINGEMENT OF LEFT SHOULDER: ICD-10-CM

## 2025-06-02 DIAGNOSIS — M67.919 TENDINOPATHY OF ROTATOR CUFF, UNSPECIFIED LATERALITY: ICD-10-CM

## 2025-06-02 DIAGNOSIS — M75.42 SUBACROMIAL IMPINGEMENT, LEFT: ICD-10-CM

## 2025-06-02 PROCEDURE — 97161 PT EVAL LOW COMPLEX 20 MIN: CPT

## 2025-06-02 PROCEDURE — 97110 THERAPEUTIC EXERCISES: CPT

## 2025-06-02 NOTE — PROGRESS NOTES
UPPER EXTREMITY EVALUATION:     Diagnosis:   Acute pain of left shoulder (M25.512)  Tendinopathy of rotator cuff, unspecified laterality (M67.919)  Traumatic tear of left rotator cuff, unspecified tear extent, initial encounter (S46.012A)  Coracoid impingement of left shoulder (M75.42)  Subacromial impingement, left (M75.42) Patient:  Kaitlyn Mcbride (64 year old, female)        Referring Provider: Alex Behar  Today's Date   6/2/2025    Precautions:  Other (use comment) (Hx of fall on L shoulder in May of 2025 and heart attack in 2006)   Date of Evaluation: 06/02/25  Next MD visit: TBD  Date of Surgery: No data recorded     PATIENT SUMMARY    used ID number: [id number]     Summary of chief complaints: L shoulder pain, post fall on May 12th, 2025.  History of current condition: L shoulder pain post fall on L side on May 12th, 2025. Pain localized to L shoulder pointing to medial deltoid, denies radiation down the arm. Denies N/T. Use of Tylenol for pain management. Went to  after fall and X-ray completed. Post fall complains of pain and weakness in L shoulder with ADL's, such as doing her hair and dressing. L subacromial injection completed on 5/28/2025 with recommendation to complete an MRI. Since injection pt reports a decrease in L shoulder pain.   Pain level: current 4 /10, at best 4 /10, at worst 8 /10  Description of symptoms: Ache   Occupation: Not working   Leisure activities/Hobbies: Walking, Spending time with her family   Prior level of function: Pain free and independent while completing ADL's with use of L UE.  Current limitations: Washing her hair, combing her hair, reaching OH, reaching in front of her, dressing  Pt goals: Feel better and improve use of L UE  Hand Dominance: right   Past medical history was reviewed with Kaitlyn.  Significant findings include: hx of heart attack  Imaging/Tests: X-ray completed, MRI scheduled for June 30th.   Kaitlyn  has a past medical history of Heart attack  ([facility]) (01/01/2006).  She  has a past surgical history that includes ORIF radius & ulna fractures (Right) and Tibia fracture surgery (Right).    ASSESSMENT  Kaitlyn presents to physical therapy evaluation with primary c/o L shoulder pain, post fall on May 12th, 2025.. The results of the objective tests and measures show ROM deficits, weakness, poor posture.. Functional deficits include but are not limited to Washing her hair, combing her hair, reaching OH, reaching in front of her, dressing. Signs and symptoms are consistent with diagnosis of Acute pain of left shoulder (M25.512)  Tendinopathy of rotator cuff, unspecified laterality (M67.919)  Traumatic tear of left rotator cuff, unspecified tear extent, initial encounter (S46.012A)  Coracoid impingement of left shoulder (M75.42)  Subacromial impingement, left (M75.42). Pt and PT discussed evaluation findings, pathology, POC and HEP.  Pt voiced understanding and performs HEP correctly without reported pain. Skilled Physical Therapy is medically necessary to address the above impairments and reach functional goals.  OBJECTIVE:    Musculoskeletal  Observation/Posture: forward head posture  Palpation: Denies TTP: AC, anterior deltoid, medial deltoid, RTC mm   Accessory motion: GHJ mobility WNL on L    Special Tests: Positive for pain reproduction with: Hawking's Mac and Speed's Test on L. Negative Test for: Lift Off Test, Empty Can, ER lag sign and IR lag sign on L.     ROM and Strength (* denotes performed with pain)  Shoulder   ROM MMT (-/5)    R L R L     Flex 160 160* 4 4     Abd (C5) 180 180* 5 4+     IR T10 (55 deg in 90 deg of abd) T5 (65 deg in 90 of abd) 5 4     ER T2 (75 deg in 90 deg of abd) C7 (85 deg in 90 deg of abd) 4 3-     Low Trap n/a 4 3     Mid Trap n/a 3 (Rhomboid: 5/5 Lat: 5/5) 4 (Rhomboid: 4/5 Lat: 5/5)     ,   Elbow   MMT (-/5)    R L     Flex (C6) 5 5     Ext (C7) 5 5          Today's Treatment and Response:   Pt education was provided on exam  findings, treatment diagnosis, treatment plan, expectations, and prognosis.  Today's Treatment       6/2/2025   UE Treatment   Therapeutic Exercise Attempted ER w/ YTB by side (discontinued too challenging)   ER by side YTB isometric x 10   IR by side YTB x 15  ER by side in S/L x 15   Therapeutic Exercise Minutes 15   Evaluation Minutes 30   Total Time Of Timed Procedures 15   Total Time Of Service-Based Procedures 30   Total Treatment Time 45   HEP Access Code: [email or web address]  URL: [email or web address]  Date: 06/02/2025  Prepared by: Karson Azar    Exercises  - Shoulder External Rotation Reactive Isometrics  - 1 x daily - 7 x weekly - 2 sets - 10 reps  - Standing Shoulder Internal Rotation with Anchored Resistance  - 1 x daily - 7 x weekly - 2 sets - 10 reps  - Sidelying Shoulder External Rotation  - 1 x daily - 7 x weekly - 3 sets - 10 reps        Patient was instructed in and issued a HEP for: Access Code: [email or web address]  URL: [email or web address]  Date: 06/02/2025  Prepared by: Karson Everno    Exercises  - Shoulder External Rotation Reactive Isometrics  - 1 x daily - 7 x weekly - 2 sets - 10 reps  - Standing Shoulder Internal Rotation with Anchored Resistance  - 1 x daily - 7 x weekly - 2 sets - 10 reps  - Sidelying Shoulder External Rotation  - 1 x daily - 7 x weekly - 3 sets - 10 reps    Charges:  PT EVAL: Low Complexity, 1 TE  In agreement with evaluation findings and clinical rationale, this evaluation involved LOW COMPLEXITY decision making due to no personal factors/comorbidities, 1-2 body structures involved/activity limitations, and stable symptoms as documented in the evaluation.                                                          PLAN OF CARE:    Goals: (to be met in 2 visits)       Not Met Progress Toward Partially Met Met   Pt will report improved ability to sleep without waking due to shoulder pain. [] [] [] []   Pt will improve shoulder flexion AROM to = 160  degrees to be able to reach into overhead cabinets without pain or restriction. [] [] [] []   Pt will be able to demonstrate HEP back to PT with proper form to demonstrate ability to complete HEP independently. [] [] [] []   Pt will increase shoulder functional ER AROM to T3 to be able to wash and comb her hair [] [] [] []   Pt will be independent and compliant with comprehensive HEP to maintain progress achieved in PT. [] [] [] []         Frequency / Duration: Patient will be seen 1-2x/week or a total of 2  visits over a 90 day period. Treatment will include: Manual Therapy; Therapeutic Activities; Therapeutic Exercise; Neuromuscular Re-education; Home Exercise Program instruction; Electrical stimulation (attended)  Pt reports she would only like to complete one additional skilled PT session for a total of one evaluation session and one treatment session due to financial reasons.     Education or treatment limitation: None   Rehab Potential: good     QuickDASH Outcome Score  Score: 38.64 % (6/2/2025  1:57 PM)      Patient/Family/Caregiver was advised of these findings, precautions, and treatment options and has agreed to actively participate in planning and for this course of care.    Thank you for your referral. Please co-sign or sign and return this letter via fax as soon as possible to [phone]. If you have any questions, please contact me at Dept: [phone]    Sincerely,  Electronically signed by therapist: Karson Askew, PT  Physician's certification required: Yes  I certify the need for these services furnished under this plan of treatment and while under my care.    X___________________________________________________ Date____________________    Certification From: 6/2/2025  To:8/31/2025

## 2025-06-06 ENCOUNTER — OFFICE VISIT (OUTPATIENT)
Dept: PHYSICAL THERAPY | Facility: HOSPITAL | Age: 65
End: 2025-06-06
Attending: PHYSICAL MEDICINE & REHABILITATION
Payer: MEDICARE

## 2025-06-06 PROCEDURE — 97110 THERAPEUTIC EXERCISES: CPT

## 2025-06-06 PROCEDURE — 97112 NEUROMUSCULAR REEDUCATION: CPT

## 2025-06-06 NOTE — PROGRESS NOTES
Discharge Summary  Pt has attended 2 visits in Physical Therapy.    Patient: Kaitlyn Mcbride (64 year old, female) Referring Provider:  Insurance:   Diagnosis: Acute pain of left shoulder (M25.512)  Tendinopathy of rotator cuff, unspecified laterality (M67.919)  Traumatic tear of left rotator cuff, unspecified tear extent, initial encounter (S46.012A)  Coracoid impingement of left shoulder (M75.42)  Subacromial impingement, left (M75.42) Vik Behar  JAYDEN WHITTINGTON   Date of Surgery: No data recorded Next MD visit:  N/A   Precautions:  Other (use comment) (Hx of fall on L shoulder in May of 2025 and heart attack in 2006) TBD Referral Information:    Date of Evaluation: Req: 8, Auth: 8, Exp: 9/2/2025 06/02/25 POC Auth Visits:  2       Today's Date   6/6/2025    Subjective   used: ID number [id number]. Pt reports she is \"better now\".  Pt reports she is doing HEP.       Pain: 0/10     Objective  See measurements below     Shoulder   Shoulder       6/2/2025 6/6/2025   Shoulder ROM/MMT   Rt Flexion Shoulder 160    Lt Flexion Shoulder 160* 165   Rt Flexion Shoulder MMT 4    Lt Flexion Shoulder MMT 4    Rt Abduction Shoulder 180    Lt Abduction Shoulder 180*    Rt Abduction Shoulder MMT (C5) 5    Lt Abduction Shoulder MMT (C5) 4+    Rt IR Shoulder T10 (55 deg in 90 deg of abd)    Lt IR Shoulder T5 (65 deg in 90 of abd)    Rt IR Shoulder MMT 5    Lt IR Shoulder MMT 4    Rt ER Shoulder T2 (75 deg in 90 deg of abd)    Lt ER Shoulder C7 (85 deg in 90 deg of abd) T2   Rt ER Shoulder MMT 4    Lt ER Shoulder MMT 3-    Rt Low Trap MMT 4    Lt Low Trap MMT 3    Rt Mid Trap MMT 3       Rhomboid: 5/5 Lat: 5/5    Lt Mid Trap MMT 4       Rhomboid: 4/5 Lat: 5/5             Assessment  Pt has attended 2 visits in skilled PT. She has completed an evaluation of L shoulder and one treatment. She also does demonstrate an improvement in shoulder flexion and ER ROM noted above, reporting they are pain-free. Due to financial reasons she  is discharging today with extensive HEP to focus on independent strengthening. She has demonstrated an ability to perform HEP with correct form. Pt is agreeable to plan.    Goals (to be met in 2 visits)      Not Met Progress Toward Partially Met Met   Pt will report improved ability to sleep without waking due to shoulder pain. [x] [] [] []   Pt will improve shoulder flexion AROM to = 160 degrees to be able to reach into overhead cabinets without pain or restriction. [] [] [] [x]   Pt will be able to demonstrate HEP back to PT with proper form to demonstrate ability to complete HEP independently. [] [] [] [x]   Pt will increase shoulder functional ER AROM to T3 to be able to wash and comb her hair [x] [] [] []   Pt will be independent and compliant with comprehensive HEP to maintain progress achieved in PT. [] [] [] [x]             Plan  Discharge with extensive HEP.      Post QuickDASH Outcome Score  Post Score: 29.55 % (6/6/2025 11:15 AM)    9.09 % improvement      Patient/Family/Caregiver was advised of these findings, precautions, and treatment options and has agreed to actively participate in planning and for this course of care.    Thank you for your referral. If you have any questions, please contact me at Dept: [phone].    Sincerely,  Electronically signed by therapist: Karson Askew PT     Physician's certification required:  No  Please co-sign or sign and return this letter via fax as soon as possible to [phone].   I certify the need for these services furnished under this plan of treatment and while under my care.    X___________________________________________________ Date____________________    Certification From: 6/6/2025  To:9/4/2025      Treatment Last 4 Visits  Treatment Day: 2       6/2/2025 6/6/2025   UE Treatment   Therapeutic Exercise Attempted ER w/ YTB by side (discontinued too challenging)   ER by side YTB isometric x 10   IR by side YTB x 15  ER by side in S/L x 15 L ER in R S/L x  20   Shoulder arm by side IR YTB x 20   Wall walks w/ straight arms x 10  HEP education   Neuro Re-Education  Chest press 2 lb WB x 20   Supine shoulder flexion 2 lb WB x 20   Rows GTB x 20   Extensions GTB x 25  Shoulder isometric walk outs ER YTB x 10  Shoulder ER isometric 5 sec x 10  LT setting YTB 3 sec x 10   Wall walks bent arms YTB x 10    Manual Therapy  PROM: L shoulder flex/abd/ER/IR     Therapeutic Exercise Minutes 15 15   Neuro Re-Educ Minutes  25   Manual Therapy Minutes  5   Evaluation Minutes 30    Total Time Of Timed Procedures 15 45   Total Time Of Service-Based Procedures 30 0   Total Treatment Time 45 45   HEP Access Code: [email or web address]  URL: [email or web address]/  Date: 06/02/2025  Prepared by: Karson Askew    Exercises  - Shoulder External Rotation Reactive Isometrics  - 1 x daily - 7 x weekly - 2 sets - 10 reps  - Standing Shoulder Internal Rotation with Anchored Resistance  - 1 x daily - 7 x weekly - 2 sets - 10 reps  - Sidelying Shoulder External Rotation  - 1 x daily - 7 x weekly - 3 sets - 10 reps Access Code: [email or web address]  URL: [email or web address]/  Date: 06/06/2025  Prepared by: Karson Askwe    Exercises  - Shoulder External Rotation Reactive Isometrics  - 1 x daily - 7 x weekly - 2 sets - 10 reps  - Standing Shoulder Internal Rotation with Anchored Resistance  - 1 x daily - 3-4 x weekly - 2 sets - 10 reps  - Sidelying Shoulder External Rotation  - 1 x daily - 3-4 x weekly - 3 sets - 10 reps  - Supine Single Arm Shoulder Protraction  - 1 x daily - 3-4 x weekly - 2 sets - 10 reps  - Standing Shoulder Row with Anchored Resistance  - 1 x daily - 3-4 x weekly - 2 sets - 10 reps  - Shoulder extension with resistance - Neutral  - 1 x daily - 3-4 x weekly - 2 sets - 10 reps  - Isometric Shoulder External Rotation at Wall  - 1 x daily - 7 x weekly - 2 sets - 10 reps - 5 sec hold  - Shoulder Flexion Wall Walk  - 1 x daily - 3-4 x weekly - 2 sets - 10 reps  - Forearm  Walks on Wall with Resistance Band  - 1 x daily - 3-4 x weekly - 2 sets - 10 reps  - Standing Low Trap Setting with Resistance at Wall  - 1 x daily - 3-4 x weekly - 2 sets - 10 reps        HEP  Access Code: [email or web address]  URL: [email or web address]  Date: 06/06/2025  Prepared by: Karson Askew    Exercises  - Shoulder External Rotation Reactive Isometrics  - 1 x daily - 7 x weekly - 2 sets - 10 reps  - Standing Shoulder Internal Rotation with Anchored Resistance  - 1 x daily - 3-4 x weekly - 2 sets - 10 reps  - Sidelying Shoulder External Rotation  - 1 x daily - 3-4 x weekly - 3 sets - 10 reps  - Supine Single Arm Shoulder Protraction  - 1 x daily - 3-4 x weekly - 2 sets - 10 reps  - Standing Shoulder Row with Anchored Resistance  - 1 x daily - 3-4 x weekly - 2 sets - 10 reps  - Shoulder extension with resistance - Neutral  - 1 x daily - 3-4 x weekly - 2 sets - 10 reps  - Isometric Shoulder External Rotation at Wall  - 1 x daily - 7 x weekly - 2 sets - 10 reps - 5 sec hold  - Shoulder Flexion Wall Walk  - 1 x daily - 3-4 x weekly - 2 sets - 10 reps  - Forearm Walks on Wall with Resistance Band  - 1 x daily - 3-4 x weekly - 2 sets - 10 reps  - Standing Low Trap Setting with Resistance at Wall  - 1 x daily - 3-4 x weekly - 2 sets - 10 reps    Charges  1 TE: 2 NRE

## 2025-06-30 ENCOUNTER — HOSPITAL ENCOUNTER (OUTPATIENT)
Dept: MRI IMAGING | Facility: HOSPITAL | Age: 65
Discharge: HOME | End: 2025-06-30
Attending: PHYSICAL MEDICINE & REHABILITATION
Payer: MEDICARE

## 2025-06-30 DIAGNOSIS — M75.42 SUBACROMIAL IMPINGEMENT, LEFT: ICD-10-CM

## 2025-06-30 DIAGNOSIS — M67.919 TENDINOPATHY OF ROTATOR CUFF, UNSPECIFIED LATERALITY: ICD-10-CM

## 2025-06-30 DIAGNOSIS — M25.512 ACUTE PAIN OF LEFT SHOULDER: ICD-10-CM

## 2025-06-30 DIAGNOSIS — S46.012A TRAUMATIC TEAR OF LEFT ROTATOR CUFF, UNSPECIFIED TEAR EXTENT, INITIAL ENCOUNTER: ICD-10-CM

## 2025-06-30 DIAGNOSIS — M75.42 CORACOID IMPINGEMENT OF LEFT SHOULDER: ICD-10-CM

## 2025-06-30 PROCEDURE — 73221 MRI JOINT UPR EXTREM W/O DYE: CPT | Performed by: PHYSICAL MEDICINE & REHABILITATION

## 2025-07-25 ENCOUNTER — OFFICE VISIT (OUTPATIENT)
Facility: CLINIC | Age: 65
End: 2025-07-25
Payer: MEDICARE

## 2025-07-25 ENCOUNTER — TELEPHONE (OUTPATIENT)
Dept: PHYSICAL MEDICINE AND REHAB | Facility: CLINIC | Age: 65
End: 2025-07-25

## 2025-07-25 VITALS — BODY MASS INDEX: 31.82 KG/M2 | WEIGHT: 191 LBS | HEIGHT: 65 IN

## 2025-07-25 DIAGNOSIS — M67.919 TENDINOPATHY OF ROTATOR CUFF, UNSPECIFIED LATERALITY: ICD-10-CM

## 2025-07-25 DIAGNOSIS — S46.012A TRAUMATIC TEAR OF LEFT ROTATOR CUFF, UNSPECIFIED TEAR EXTENT, INITIAL ENCOUNTER: ICD-10-CM

## 2025-07-25 DIAGNOSIS — M25.512 ACUTE PAIN OF LEFT SHOULDER: Primary | ICD-10-CM

## 2025-07-25 DIAGNOSIS — S46.012A TRAUMATIC COMPLETE TEAR OF LEFT ROTATOR CUFF, INITIAL ENCOUNTER: Primary | ICD-10-CM

## 2025-07-25 DIAGNOSIS — M75.42 SUBACROMIAL IMPINGEMENT, LEFT: ICD-10-CM

## 2025-07-25 DIAGNOSIS — M75.42 CORACOID IMPINGEMENT OF LEFT SHOULDER: ICD-10-CM

## 2025-07-25 PROCEDURE — 3008F BODY MASS INDEX DOCD: CPT | Performed by: STUDENT IN AN ORGANIZED HEALTH CARE EDUCATION/TRAINING PROGRAM

## 2025-07-25 PROCEDURE — 99203 OFFICE O/P NEW LOW 30 MIN: CPT | Performed by: STUDENT IN AN ORGANIZED HEALTH CARE EDUCATION/TRAINING PROGRAM

## 2025-07-25 RX ORDER — AMIODARONE HYDROCHLORIDE 100 MG/1
200 TABLET ORAL DAILY
COMMUNITY
End: 2025-07-26 | Stop reason: CLARIF

## 2025-07-26 NOTE — PROGRESS NOTES
[place] - ORTHOPEDICS  [address] [facility] [address]  [phone]     NURSING INTAKE COMMENTS:   Chief Complaint   Patient presents with    Consult     Pt. Presents for a consult on left complete rotator tear cuff. Pt. fell on Left side on 05/12/25 after tripping on a shoe rack. Referred Dr.Behar. Rating pain 5/10. MRI of the left shoulder done 06/30/25 in epic.            The following individual(s) verbally consented to be recorded using ambient AI listening technology and understand that they can each withdraw their consent to this listening technology at any point by asking the clinician to turn off or pause the recording:    Patient name: Kaitlyn Mcbride      HPI:   History of Present Illness  Kaitlyn Mcbride is a 64 year old female who presents with right shoulder pain following a fall. She was referred by Dr. Behar for evaluation of her shoulder pain.    She has been experiencing right shoulder pain since a fall on May 2, 2025, when she landed on her arm. The pain has persisted since the fall, and she initially waited to seek medical attention due to bruising. The pain is constant throughout the day, especially exacerbated by raising her arm, which affects her ability to perform daily activities such as doing her hair and drinking. She describes difficulty with certain movements, stating 'sometimes when I go to drink something, I can't.'    She has a history of shoulder problems, but notes that the current pain is different and more severe than previous episodes. She has not had significant shoulder pain prior to this incident that warranted medical attention.    An MRI was performed prior to this visit. She has been experiencing functional limitations due to the pain, although she retains some ability to move her arm.      Past Medical History[1]  Past Surgical History[2]  Current Medications[3]  Allergies[4]  Family History[5]    Social History     Occupational History    Not on file   Tobacco Use     Smoking status: Never    Smokeless tobacco: Never   Vaping Use    Vaping status: Never Used   Substance and Sexual Activity    Alcohol use: Never    Drug use: Never    Sexual activity: Not on file        Review of Systems:  GENERAL: denies fevers, chills, night sweats, fatigue, unintentional weight loss/gain  SKIN: denies skin lesions, open sores, rash  HEENT:denies recent vision change, new nasal congestion,hearing loss, tinnitus, sore throat, headaches  RESPIRATORY: denies new shortness of breath, cough, asthma, wheezing  CARDIOVASCULAR: denies chest pain, leg cramps with exertion, palpitations, leg swelling  GI: denies abdominal pain, nausea, vomiting, diarrhea, constipation, hematochezia, worsening heartburn or stomach ulcers  : denies dysuria, hematuria, incontinence, increased frequency, urgency, difficulty urinating  MUSCULOSKELETAL: denies musculoskeletal complaints other than in HPI  NEURO: denies numbness, tingling, weakness, balance issues, dizziness, memory loss  PSYCHIATRIC: denies Hx of depression, anxiety, other psychiatric disorders  HEMATOLOGIC: denies blood clots, anemia, blood clotting disorders, blood transfusion  ENDOCRINE: denies autoimmune disease, thyroid issues, or diabetes  ALLERGY: denies asthma, seasonal allergies    Physical Examination:    Ht 5' 5\" (1.651 m)   Wt 191 lb (86.6 kg)   BMI 31.78 kg/m²     Physical Exam  MUSCULOSKELETAL: Good shoulder function.  SKIN: Skin in good condition.    Constitutional: appears well hydrated, alert and responsive, no acute distress noted    LEFT Shoulder Exam    Forward elevation 140, abduction 140, ER 30, IR back pocket, 4/5 strength resisted abduction, 4/5 strength resisted ER with arm adducted, positive O'janelle, neuro intact distally      Imaging:     Results  RADIOLOGY  Shoulder MRI: Torn tendon with significant retraction; humeral head superior migration indicating chronic rotator cuff tear.  Shoulder X-ray: Poor quality images; unable to  assess details.      Imaging was independently reviewed and interpreted by attending physician  MRI SHOULDER, LEFT (CPT=73221)  Result Date: 7/8/2025  PROCEDURE: MRI SHOULDER, LEFT (CPT=73221) INDICATIONS: Left shoulder pain. DX-M75.42 Subacromial impingement, left;DX-M75.42 Coracoid impingement of left shoulder;DX-S46.012A Traumatic tear of left rotator cuff, unspecified tear extent, initial encounter;DX-M67.919 Tendinopathy of rotator cuff, unspecified laterality;DX-M25.512 Acute pain of left shoulder; TECHNIQUE: Multiplanar multi parametric MRI left shoulder without IV gadolinium. FINDINGS: ROTATOR CUFF: *  Full-thickness supraspinatus tendon tear with mediolateral and AP dimension of approximately 5 cm extending posteriorly into infraspinatus. *  Interstitial partial tear superior fibers of subscapularis. *  Significant atrophy of supraspinatus and infraspinatus muscles. *  Moderate subacromial/subdeltoid bursal effusion and subcoracoid bursal effusion. A.C. JOINT: *  Type II acromion with lateral downsloping. Remodeling of the undersurface of the acromion related to the high riding humeral head.. *  Intact AC joint with no undersurface impinging spur. GLENOHUMERAL JOINT, LABRUM, BICEPS: *  Articular cartilage loss over the superolateral humeral head. Moderate shoulder joint effusion communicating with subacromial/subdeltoid bursa *  Degenerative tearing of the superior labrum. *  Long head biceps tendinosis with intrasubstance partial tearing of the intra-articular segment. OTHER: No suspicious bone lesion or acute fracture..     CONCLUSION: 1.  Massive full-thickness rotator cuff tear involving supraspinatus and infraspinatus tendons with muscular atrophy. 2.  Interstitial partial tear superior fibers subscapularis partly related to subcoracoid impingement 3.  Subacromial/subdeltoid and subcoracoid bursitis. 4.  Shoulder joint effusion communicating with adjacent bursa. 5.  Long head biceps tendinosis and  interstitial partial tearing. 6.  Glenohumeral joint and AC joint osteoarthritis. Electronically Verified and Signed by Attending Radiologist: Harjinder Jacome MD 7/8/2025 7:50 PM Workstation: [id number]       Labs:  Lab Results   Component Value Date    WBC 7.8 11/12/2023    HGB 12.9 11/12/2023    .0 11/12/2023      Lab Results   Component Value Date     (H) 11/12/2023    BUN 21 11/12/2023    CREATSERUM 1.18 (H) 11/12/2023    GFRNAA >60 11/30/2018    GFRAA >60 11/30/2018        Assessment and Plan:  Assessment & Plan  Chronic rotator cuff tear  Chronic tear with significant retraction and humeral head elevation. Anatomical changes make surgical repair unlikely to succeed. Functional limitations present. Surgery not recommended due to low success probability and potential need for future partial shoulder replacement.  - Refer to Doctor Behar for shoulder injection and fluid drainage.  - Initiate physical therapy for shoulder function.  - Monitor shoulder condition and consider future surgery if symptoms worsen.      There are no diagnoses linked to this encounter.        Follow Up: No follow-ups on file.          Zeke Duarte MD Orthopedic Surgery / Sports Medicine Specialist  [facility]  [address] [place] [address]  [email or web address]    t: [phone] f: [phone]    This note was dictated using Dragon software.  While it was briefly proofread prior to completion, some grammatical, spelling, and word choice errors due to dictation may still occur.       [1]   Past Medical History:   Heart attack (HCC)   [2]   Past Surgical History:  Procedure Laterality Date    Orif radius & ulna fractures Right     Tibia fracture surgery Right    [3]   Current Outpatient Medications   Medication Sig Dispense Refill    acetaminophen 325 MG Oral Tab Take 2 tablets (650 mg total) by mouth every 4 to 6 hours as needed for Pain or Fever. 40 tablet 0    omeprazole 20 MG Oral Capsule  Delayed Release Take 1 capsule (20 mg total) by mouth 2 (two) times daily before meals. 40 capsule 0    aspirin 81 MG Oral Tab EC Take 1 tablet (81 mg total) by mouth.      Calcium Carbonate 1500 (600 Ca) MG Oral Tab Take 600 mg by mouth As Directed.      amiodarone 200 MG Oral Tab Take 1 tablet (200 mg total) by mouth daily.      amiodarone 100 MG Oral Tab 2 tablets (200 mg total) daily.     [4] No Known Allergies  [5]   Family History  Problem Relation Age of Onset    No Known Problems Mother     Diabetes Sister     Diabetes Brother

## 2025-08-28 ENCOUNTER — OFFICE VISIT (OUTPATIENT)
Dept: PHYSICAL MEDICINE AND REHAB | Facility: CLINIC | Age: 65
End: 2025-08-28

## 2025-08-28 DIAGNOSIS — M75.42 SUBACROMIAL IMPINGEMENT, LEFT: ICD-10-CM

## 2025-08-28 DIAGNOSIS — M67.919 TENDINOPATHY OF ROTATOR CUFF, UNSPECIFIED LATERALITY: ICD-10-CM

## 2025-08-28 DIAGNOSIS — M25.512 ACUTE PAIN OF LEFT SHOULDER: Primary | ICD-10-CM

## 2025-08-28 DIAGNOSIS — S46.012A TRAUMATIC TEAR OF LEFT ROTATOR CUFF, UNSPECIFIED TEAR EXTENT, INITIAL ENCOUNTER: ICD-10-CM

## 2025-08-28 DIAGNOSIS — M75.42 CORACOID IMPINGEMENT OF LEFT SHOULDER: ICD-10-CM

## 2025-08-28 RX ORDER — TRIAMCINOLONE ACETONIDE 40 MG/ML
40 INJECTION, SUSPENSION INTRA-ARTICULAR; INTRAMUSCULAR ONCE
Status: COMPLETED | OUTPATIENT
Start: 2025-08-28 | End: 2025-08-28

## 2025-08-28 RX ORDER — LIDOCAINE HYDROCHLORIDE 10 MG/ML
12 INJECTION, SOLUTION INFILTRATION; PERINEURAL ONCE
Status: COMPLETED | OUTPATIENT
Start: 2025-08-28 | End: 2025-08-28

## (undated) NOTE — LETTER
Notifier: [facility]       Patient Name: Kaitlyn Mcbride       Identification Number: [id number]                          Advance Beneficiary Notice of Noncoverage (ABN)   NOTE:  If Medicare doesn’t pay for D. Items/service(s) below, you may have to pay.  Medicare does not pay for everything, even some care that you or your health care provider have good reason to think you need. We expect Medicare may not pay for the D. items/service(s) below.  Items or Services Reason Medicare May Not Pay: Estimated Cost       RIGHT posterior tibialis tendon CSI unde ultrasound  __ Medicare does not cover this service      __ Medicare may not pay for this   item/service for your condition     __ Medicare may not pay for this item/service as often as this        WHAT YOU NEED TO DO NOW:  Read this notice, so you can make an informed decision about your care.  Ask us any questions that you may have after you finish reading.  Choose an option below about whether to receive the D. item/service(s)  listed above.  Note: If you choose Option 1 or 2, we may help you to use any other insurance that you might have, but Medicare cannot require us to do this.  OPTIONS: Check only one box.  We cannot choose a box for you.   OPTION 1. I want the D. item/service(s) listed above. You may ask to be paid now, but I also want Medicare billed for an official decision on payment, which is sent to me on a Medicare Summary Notice (MSN). I understand that if Medicare doesn’t pay, I am responsible for payment, but I can appeal to Medicare by following the directions on the MSN. If Medicare does pay, you will refund any payments I made to you, less co-pays or deductibles.  OPTION 2. I want the D. item/service(s) listed above, but do not bill Medicare. You may ask to be paid now as I am responsible for payment. I cannot appeal if Medicare is not billed.  OPTION 3. I don't want the D. item/service(s) listed above. I understand with this choice I am not  responsible for payment, and I cannot appeal to see if Medicare would pay.    H. Additional Information:    This notice gives our opinion, not an official Medicare decision. If you have other questions on this notice or Medicare billing, call 1-800-MEDICARE (1-320.346.9913/TTY: 1-320.940.9777). Signing below means that you have received and understand this notice. You also receive a copy.  Signature: Date:       You have the right to get Medicare information in an accessible format, like large print, Braille, or audio. You also have the right to file a complaint if you feel you’ve been discriminated against. Visit Medicare.gov/about- us/ydqmwdqroszdh-siuedrgfadvevyjmv-ukfydv.  According to the Paperwork Reduction Act of 1995, no persons are required to respond to a collection of information unless it displays a valid OMB control number. The valid OMB control number for this information collection is 2169-6468. The time required to complete this information collection is estimated to average 7 minutes per response, including the time to review instructions, search existing data resources, gather the data needed, and complete and review the information collection. If you have comments concerning the accuracy of the time estimate or suggestions for improving this form, please write to: CMS, [facility] [employer]     [place], Attn: ARIEL Reports Clearance Officer, [place], [place] [address].  Form CMS-R-131 (Exp. 1/31/2026) Form Approved OMB No. [id number]

## (undated) NOTE — LETTER
AUTHORIZATION FOR SURGICAL OPERATION OR OTHER PROCEDURE    1. I hereby authorize Dr. Danielle Morin and the [facility] staff assigned to my case to perform the following operation and/or procedure at the [facility]:    L knee bakers' cyst aspiration    2.   My physi Parent    Responsible person                          []  Spouse  In case of minor or                    [] Other  _____________   Incompetent name:  __________________________________________________                               (please print)      _____

## (undated) NOTE — LETTER
[facility]. Notifier: eMar. Patient Name: Kaitlyn Mcbride Identification Number: [id number]  Aviso anticipado de no cobertura al beneficiario (ABN, por luis fernando siglas   en inglés)   NOTA: Si Medicare no paga por los artículos o servicios a continuación, usted podría tener que pagar. Medicare no paga por todo, incluidos algunos cuidados que usted o chapa proveedor de atención médica entienda que son necesarios. Se anticipa que Medicare no pague por los artículos o servicios a continuación.  D. Artículos o servicios  LEFT subacromial CSI  E. Motivo por el cual Medicare podría carlnee el pago: F. Costo estimado   __ EKG ($87.00)  __ Pap smear ($101) __Pelvic/Breast ($147.00)  __ Ear Irrigation ($138)  _x_ Injection(s)  ___ Tdap ($181)       ___ Meningitis ($290)   __Prevnar ($555)  ___ Td ($66)              ___ Prevnar 20 ($549)  ___ Hep A ($152)     ___ Prolia ($1827)         __ Xiaflex ($         )   ___ Hep B ($150)     ___ Pneumovax($287)                                     ___ Vaccine Administration ($65)   __ Medicare no se cubre maira artículo o servicio      __ No se cubre maira artículo o servicio para chapa condición     __ Es posible que Medicare no pague por maira artículo o servicio con tanta frecuencia        LO QUE USTED DEBE HACER AHORA:  Delores maira aviso para poder priscilla ashley decisión informada sobre luis fernando cuidados.  Háganos las preguntas que tenga después de terminar de leer.  Elija ashley opción a continuación sobre si recibirá los artículos o servicios que se indica arriba.       Nota: Si elige Opción 1 o 2, podríamos ayudarle a utilizar los otros seguros que tenga,        mee Medicare no nos puede obligar a hacer esto.  G. Opciones: Thang solamente ashley jose. No podemos elegir la jose para usted.    OPCIÓN 1. Quiero los artículos o servicios que se indica arriba. Pudiera pedir el pago ahora, mee yo también solicito que se facture a Medicare para obtener ashley decisión oficial respecto al pago,  la cual me será enviada en un Resumen de Medicare (MSN, por luis fernando siglas en inglés). Entiendo que, si Medicare no paga, yo seré responsable del pago, mee puedo apelar a Medicare según las indicaciones en el MSN. Si Medicare pagará, me serán reembolsados todos los pagos que yo haya hecho, daniel los copagos o deducibles.   OPCIÓN 2. Quiero los artículos o servicios que se indica arriba, mee no  facture a Medicare. Se podrá pedir el pago ahora, ya que yo soy responsable del pago. No podré apelar si no se facturara a Medicare.   OPCIÓN 3. No quiero los artículos o servicios que se indica arriba. Entiendo que, con esta elección, no seré responsable del pago, y no podré apelar para saber si Medicare hubiera pagado.    H. Información adicional:   Sue aviso explica nuestra opinión y no constituye ashley decisión oficial de Medicare. Si usted tiene otras preguntas relativas a sue aviso o la facturación de Medicare, llame al 1-800-MEDICARE (1-880-942-6611/TTY: 1-877-486-2048). Firme abajo para reconocer magdalena recibido y entendido sue aviso. Usted también recibirá ashley copia.  JOSIAH Osuna:   ENA Mendoza:       Tiene derecho a obtener información de Medicare en un formato accesible, evgeny letra mino, braille o audio. También tiene derecho a presentar ashley queja si siente que ha sido discriminado. Visite Medicare.gov/about-us/qpcszprceceov-ikhcadblsjxrmfqfi-vparpg.    De acuerdo con la Jill para la Reducción de Trámites de 1995, ninguna persona será obligada a responder a ashley recopilación de información a menos que se exhiba un número de control válido de la OMB. El número de control válido de la OMB para esta recopilación de información es [phone]. El tiempo necesario para completar esta recopilación de información es de aproximadamente 7 minutos por respuesta, incluido el tiempo para revisar las instrucciones, buscar riley de datos existentes, reunir los datos necesarios, y completar y revisar la recopilación de información. Si  tiene preguntas sobre la precisión del estimado de tiempo o sugerencias para mejorar maira formulario, escriba a: CMS, [facility]     [employer] [place], Attn: PRA Reports Clearance Officer, [place], [place] [address].    Formulario CMS-R-131 (Exp. 01/31/2026)                                      Formulario aprobado [facility] No. [id number]

## (undated) NOTE — LETTER
AUTHORIZATION FOR SURGICAL OPERATION OR OTHER PROCEDURE    1. I hereby authorize Dr. Alex Behar and the [facility] staff assigned to my case to perform the following operation and/or procedure at the [facility]:    LEFT subacromial CSI     2.  My physician has explained the nature and purpose of the operation or other procedure, possible alternative methods of treatment, the risks involved, and the possibility of complication to me.  I acknowledge that no guarantee has been made as to the result that may be obtained.  3.  I recognize that, during the course of this operation, or other procedure, unforseen conditions may necessitate additional or different procedure than those listed above.  I, therefore, further authorize and request that the above named physician, his/her physician assistants or designees perform such procedures as are, in his/her professional opinion, necessary and desirable.  4.  Any tissue or organs removed in the operation or other procedure may be disposed of by and at the discretion of the [facility] staff and [facility].  5.  I understand that in the event of a medical emergency, I will be transported by local paramedics to [facility] or other hospital emergency department.  6.  I certify that I have read and fully understand the above consent to operation and/or other procedure.    7.  I acknowledge that my physician has explained sedation/analgesia administration to me including the risks and benefits.  I consent to the administration of sedation/analgesia as may be necessary or desirable in the judgement of my physician.    Witness signature: ___________________________________________________ Date:  ______/______/_____                    Time:  ________ A.M.  P.M.       Patient Name:    Kaitlyn Mcbride  9/24/1960  [id number]       Patient signature:  ___________________________________________________               Statement of Physician  My signature  below affirms that prior to the time of the procedure, I have explained to the patient and/or his/her guardian, the risks and benefits involved in the proposed treatment and any reasonable alternative to the proposed treatment.  I have also explained the risks and benefits involved in the refusal of the proposed treatment and have answered the patient's questions.                        Date:  ______/______/_______  Provider                      Signature:  __________________________________________________________       Time:  ___________ A.M    P.M.

## (undated) NOTE — LETTER
AUTHORIZATION FOR SURGICAL OPERATION OR OTHER PROCEDURE    1. I hereby authorize Dr. Scott Barber and the [facility] staff assigned to my case to perform the following operation and/or procedure at the [facility]:    LEFT knee CSI and baker cyst aspiration under ultrasound guidance . 2. My physician has explained the nature and purpose of the operation or other procedure, possible alternative methods of treatment, the risks involved, and the possibility of complication to me. I acknowledge that no guarantee has been made as to the result that may be obtained. 3.  I recognize that, during the course of this operation, or other procedure, unforseen conditions may necessitate additional or different procedure than those listed above. I, therefore, further authorize and request that the above named physician, his/her physician assistants or designees perform such procedures as are, in his/her professional opinion, necessary and desirable. 4.  Any tissue or organs removed in the operation or other procedure may be disposed of by and at the discretion of the [facility] staff and [facility]. 5.  I understand that in the event of a medical emergency, I will be transported by local paramedics to [facility] or other \[facility]\"" emergency department. 6.  I certify that I have read and fully understand the above consent to operation and/or other procedure. 7.  I acknowledge that my physician has explained sedation/analgesia administration to me including the risks and benefits. I consent to the administration of sedation/analgesia as may be necessary or desirable in the judgement of my physician. Witness signature: ___________________________________________________ Date:  ______/______/_____                    Time:  ________ A. M.  P.M.        Patient Name:  Sheron Raymond  9/24/1960  [id number]         Patient signature: ___________________________________________________               Statement of Physician  My signature below affirms that prior to the time of the procedure, I have explained to the patient and/or his/her guardian, the risks and benefits involved in the proposed treatment and any reasonable alternative to the proposed treatment. I have also explained the risks and benefits involved in the refusal of the proposed treatment and have answered the patient's questions.                         Date:  ______/______/_______  Provider                      Signature:  __________________________________________________________       Time:  ___________ AJEANNIE ELLINGTON

## (undated) NOTE — LETTER
AUTHORIZATION FOR SURGICAL OPERATION OR OTHER PROCEDURE    1. I hereby authorize Dr. Alex Behar and the [facility] staff assigned to my case to perform the following operation and/or procedure at the [facility]:    RIGHT posterior tibialis tendon CSI unde ultrasound     2.  My physician has explained the nature and purpose of the operation or other procedure, possible alternative methods of treatment, the risks involved, and the possibility of complication to me.  I acknowledge that no guarantee has been made as to the result that may be obtained.  3.  I recognize that, during the course of this operation, or other procedure, unforseen conditions may necessitate additional or different procedure than those listed above.  I, therefore, further authorize and request that the above named physician, his/her physician assistants or designees perform such procedures as are, in his/her professional opinion, necessary and desirable.  4.  Any tissue or organs removed in the operation or other procedure may be disposed of by and at the discretion of the [facility] staff and [facility].  5.  I understand that in the event of a medical emergency, I will be transported by local paramedics to [facility] or other hospital emergency department.  6.  I certify that I have read and fully understand the above consent to operation and/or other procedure.    7.  I acknowledge that my physician has explained sedation/analgesia administration to me including the risks and benefits.  I consent to the administration of sedation/analgesia as may be necessary or desirable in the judgement of my physician.    Witness signature: ___________________________________________________ Date:  ______/______/_____                    Time:  ________ A.M.  P.M.       Patient Name:  Kaitlyn Mcbride  9/24/1960  [id number]       Patient signature:   ___________________________________________________                   Statement of Physician  My signature below affirms that prior to the time of the procedure, I have explained to the patient and/or his/her guardian, the risks and benefits involved in the proposed treatment and any reasonable alternative to the proposed treatment.  I have also explained the risks and benefits involved in the refusal of the proposed treatment and have answered the patient's questions.                        Date:  ______/______/_______  Provider                      Signature:  __________________________________________________________       Time:  ___________ AJEANNIE ELLINGTON